# Patient Record
Sex: MALE | Race: WHITE | NOT HISPANIC OR LATINO
[De-identification: names, ages, dates, MRNs, and addresses within clinical notes are randomized per-mention and may not be internally consistent; named-entity substitution may affect disease eponyms.]

---

## 2018-01-12 ENCOUNTER — TRANSCRIPTION ENCOUNTER (OUTPATIENT)
Age: 58
End: 2018-01-12

## 2020-03-02 ENCOUNTER — OUTPATIENT (OUTPATIENT)
Dept: OUTPATIENT SERVICES | Facility: HOSPITAL | Age: 60
LOS: 1 days | End: 2020-03-02

## 2020-05-08 ENCOUNTER — TRANSCRIPTION ENCOUNTER (OUTPATIENT)
Age: 60
End: 2020-05-08

## 2020-09-28 ENCOUNTER — OUTPATIENT (OUTPATIENT)
Dept: OUTPATIENT SERVICES | Facility: HOSPITAL | Age: 60
LOS: 1 days | End: 2020-09-28

## 2020-10-02 ENCOUNTER — TRANSCRIPTION ENCOUNTER (OUTPATIENT)
Age: 60
End: 2020-10-02

## 2020-10-13 PROBLEM — Z00.00 ENCOUNTER FOR PREVENTIVE HEALTH EXAMINATION: Status: ACTIVE | Noted: 2020-10-13

## 2020-10-15 ENCOUNTER — NON-APPOINTMENT (OUTPATIENT)
Age: 60
End: 2020-10-15

## 2020-10-15 ENCOUNTER — APPOINTMENT (OUTPATIENT)
Dept: CARDIOLOGY | Facility: CLINIC | Age: 60
End: 2020-10-15
Payer: COMMERCIAL

## 2020-10-15 VITALS
WEIGHT: 239 LBS | HEIGHT: 71 IN | OXYGEN SATURATION: 99 % | DIASTOLIC BLOOD PRESSURE: 76 MMHG | TEMPERATURE: 96.9 F | SYSTOLIC BLOOD PRESSURE: 142 MMHG | HEART RATE: 91 BPM | RESPIRATION RATE: 18 BRPM

## 2020-10-15 DIAGNOSIS — Z63.5 DISRUPTION OF FAMILY BY SEPARATION AND DIVORCE: ICD-10-CM

## 2020-10-15 DIAGNOSIS — R94.39 ABNORMAL RESULT OF OTHER CARDIOVASCULAR FUNCTION STUDY: ICD-10-CM

## 2020-10-15 DIAGNOSIS — I48.0 PAROXYSMAL ATRIAL FIBRILLATION: ICD-10-CM

## 2020-10-15 DIAGNOSIS — Z72.3 LACK OF PHYSICAL EXERCISE: ICD-10-CM

## 2020-10-15 DIAGNOSIS — I10 ESSENTIAL (PRIMARY) HYPERTENSION: ICD-10-CM

## 2020-10-15 DIAGNOSIS — Z82.49 FAMILY HISTORY OF ISCHEMIC HEART DISEASE AND OTHER DISEASES OF THE CIRCULATORY SYSTEM: ICD-10-CM

## 2020-10-15 DIAGNOSIS — Z87.442 PERSONAL HISTORY OF URINARY CALCULI: ICD-10-CM

## 2020-10-15 PROCEDURE — 93000 ELECTROCARDIOGRAM COMPLETE: CPT

## 2020-10-15 PROCEDURE — 99204 OFFICE O/P NEW MOD 45 MIN: CPT

## 2020-10-15 RX ORDER — AMLODIPINE BESYLATE 10 MG/1
10 TABLET ORAL
Qty: 90 | Refills: 3 | Status: ACTIVE | COMMUNITY
Start: 2020-10-15

## 2020-10-15 RX ORDER — CLOPIDOGREL BISULFATE 75 MG/1
75 TABLET, FILM COATED ORAL DAILY
Qty: 30 | Refills: 1 | Status: ACTIVE | COMMUNITY
Start: 2020-10-15 | End: 1900-01-01

## 2020-10-15 RX ORDER — IRBESARTAN 150 MG/1
150 TABLET ORAL
Qty: 90 | Refills: 1 | Status: ACTIVE | COMMUNITY
Start: 2020-10-15

## 2020-10-15 RX ORDER — TAMSULOSIN HYDROCHLORIDE 0.4 MG/1
0.4 CAPSULE ORAL
Qty: 90 | Refills: 0 | Status: DISCONTINUED | COMMUNITY
Start: 2020-10-15 | End: 2020-10-15

## 2020-10-15 RX ORDER — METOPROLOL SUCCINATE 100 MG/1
100 TABLET, EXTENDED RELEASE ORAL
Qty: 90 | Refills: 3 | Status: ACTIVE | COMMUNITY
Start: 2020-10-15

## 2020-10-15 SDOH — SOCIAL STABILITY - SOCIAL INSECURITY: DISRUPTION OF FAMILY BY SEPARATION AND DIVORCE: Z63.5

## 2020-10-16 PROBLEM — R94.39 ABNORMAL STRESS TEST: Status: ACTIVE | Noted: 2020-10-15

## 2020-10-16 PROBLEM — I10 HTN (HYPERTENSION): Status: ACTIVE | Noted: 2020-10-15

## 2020-10-16 NOTE — DISCUSSION/SUMMARY
[FreeTextEntry1] : Patient with history of hypertension and limited exercise capacity. Presenting with intermediate risk nuclear stress test. \par Plan for cardiac cath next week. Had a lengthy discussion with family regarding risks/benefits of the procedure. \par He is allergic to aspirin which appears to be true allergy and non insensitivity, will plan for plavix 75 mg once a day. If PCI needed, will consider plavix bid vs brilinta.

## 2020-10-16 NOTE — HISTORY OF PRESENT ILLNESS
[FreeTextEntry1] : Patient with history of hypertension, was planned for hip surgery. Underwent nuclear stress testing with moderate inferior ischemia with ? inferior infarct. \par Patient presents for evaluation for cardiac cath.\par Not extremely physically active. \par No significant symptoms. \par

## 2020-10-16 NOTE — PHYSICAL EXAM
[General Appearance - Well Developed] : well developed [Normal Conjunctiva] : the conjunctiva exhibited no abnormalities [Normal Oral Mucosa] : normal oral mucosa [Normal Jugular Venous V Waves Present] : normal jugular venous V waves present [Heart Rate And Rhythm] : heart rate and rhythm were normal [Bowel Sounds] : normal bowel sounds [] : no respiratory distress [Abnormal Walk] : normal gait [Nail Clubbing] : no clubbing of the fingernails [Oriented To Time, Place, And Person] : oriented to person, place, and time [Skin Color & Pigmentation] : normal skin color and pigmentation

## 2020-10-17 DIAGNOSIS — Z01.818 ENCOUNTER FOR OTHER PREPROCEDURAL EXAMINATION: ICD-10-CM

## 2020-10-18 ENCOUNTER — APPOINTMENT (OUTPATIENT)
Dept: DISASTER EMERGENCY | Facility: CLINIC | Age: 60
End: 2020-10-18

## 2020-10-19 LAB — SARS-COV-2 N GENE NPH QL NAA+PROBE: NOT DETECTED

## 2020-10-21 ENCOUNTER — TRANSCRIPTION ENCOUNTER (OUTPATIENT)
Age: 60
End: 2020-10-21

## 2020-10-21 ENCOUNTER — INPATIENT (INPATIENT)
Facility: HOSPITAL | Age: 60
LOS: 0 days | Discharge: HOME | DRG: 287 | End: 2020-10-21
Attending: INTERNAL MEDICINE | Admitting: INTERNAL MEDICINE
Payer: COMMERCIAL

## 2020-10-21 VITALS
TEMPERATURE: 98 F | RESPIRATION RATE: 18 BRPM | HEIGHT: 71 IN | SYSTOLIC BLOOD PRESSURE: 151 MMHG | HEART RATE: 84 BPM | DIASTOLIC BLOOD PRESSURE: 81 MMHG | OXYGEN SATURATION: 96 % | WEIGHT: 240.08 LBS

## 2020-10-21 VITALS — RESPIRATION RATE: 17 BRPM | SYSTOLIC BLOOD PRESSURE: 123 MMHG | DIASTOLIC BLOOD PRESSURE: 74 MMHG | HEART RATE: 62 BPM

## 2020-10-21 DIAGNOSIS — Z98.890 OTHER SPECIFIED POSTPROCEDURAL STATES: Chronic | ICD-10-CM

## 2020-10-21 DIAGNOSIS — I20.0 UNSTABLE ANGINA: ICD-10-CM

## 2020-10-21 DIAGNOSIS — R07.9 CHEST PAIN, UNSPECIFIED: ICD-10-CM

## 2020-10-21 LAB
ALBUMIN SERPL ELPH-MCNC: 4.5 G/DL — SIGNIFICANT CHANGE UP (ref 3.3–5.2)
ALP SERPL-CCNC: 72 U/L — SIGNIFICANT CHANGE UP (ref 40–120)
ALT FLD-CCNC: 17 U/L — SIGNIFICANT CHANGE UP
ANION GAP SERPL CALC-SCNC: 14 MMOL/L — SIGNIFICANT CHANGE UP (ref 5–17)
APTT BLD: 30.1 SEC — SIGNIFICANT CHANGE UP (ref 27.5–35.5)
AST SERPL-CCNC: 15 U/L — SIGNIFICANT CHANGE UP
BASOPHILS # BLD AUTO: 0.04 K/UL — SIGNIFICANT CHANGE UP (ref 0–0.2)
BASOPHILS NFR BLD AUTO: 0.7 % — SIGNIFICANT CHANGE UP (ref 0–2)
BILIRUB SERPL-MCNC: 0.7 MG/DL — SIGNIFICANT CHANGE UP (ref 0.4–2)
BLD GP AB SCN SERPL QL: SIGNIFICANT CHANGE UP
BUN SERPL-MCNC: 19 MG/DL — SIGNIFICANT CHANGE UP (ref 8–20)
CALCIUM SERPL-MCNC: 9.5 MG/DL — SIGNIFICANT CHANGE UP (ref 8.6–10.2)
CHLORIDE SERPL-SCNC: 102 MMOL/L — SIGNIFICANT CHANGE UP (ref 98–107)
CO2 SERPL-SCNC: 24 MMOL/L — SIGNIFICANT CHANGE UP (ref 22–29)
CREAT SERPL-MCNC: 1.11 MG/DL — SIGNIFICANT CHANGE UP (ref 0.5–1.3)
EOSINOPHIL # BLD AUTO: 0.05 K/UL — SIGNIFICANT CHANGE UP (ref 0–0.5)
EOSINOPHIL NFR BLD AUTO: 0.8 % — SIGNIFICANT CHANGE UP (ref 0–6)
GLUCOSE SERPL-MCNC: 115 MG/DL — HIGH (ref 70–99)
HCT VFR BLD CALC: 47.4 % — SIGNIFICANT CHANGE UP (ref 39–50)
HGB BLD-MCNC: 16.3 G/DL — SIGNIFICANT CHANGE UP (ref 13–17)
IMM GRANULOCYTES NFR BLD AUTO: 0.5 % — SIGNIFICANT CHANGE UP (ref 0–1.5)
INR BLD: 1.05 RATIO — SIGNIFICANT CHANGE UP (ref 0.88–1.16)
LYMPHOCYTES # BLD AUTO: 1.43 K/UL — SIGNIFICANT CHANGE UP (ref 1–3.3)
LYMPHOCYTES # BLD AUTO: 23.7 % — SIGNIFICANT CHANGE UP (ref 13–44)
MAGNESIUM SERPL-MCNC: 2.1 MG/DL — SIGNIFICANT CHANGE UP (ref 1.6–2.6)
MCHC RBC-ENTMCNC: 29.7 PG — SIGNIFICANT CHANGE UP (ref 27–34)
MCHC RBC-ENTMCNC: 34.4 GM/DL — SIGNIFICANT CHANGE UP (ref 32–36)
MCV RBC AUTO: 86.3 FL — SIGNIFICANT CHANGE UP (ref 80–100)
MONOCYTES # BLD AUTO: 0.55 K/UL — SIGNIFICANT CHANGE UP (ref 0–0.9)
MONOCYTES NFR BLD AUTO: 9.1 % — SIGNIFICANT CHANGE UP (ref 2–14)
NEUTROPHILS # BLD AUTO: 3.93 K/UL — SIGNIFICANT CHANGE UP (ref 1.8–7.4)
NEUTROPHILS NFR BLD AUTO: 65.2 % — SIGNIFICANT CHANGE UP (ref 43–77)
NT-PROBNP SERPL-SCNC: 23 PG/ML — SIGNIFICANT CHANGE UP (ref 0–300)
PLATELET # BLD AUTO: 246 K/UL — SIGNIFICANT CHANGE UP (ref 150–400)
POTASSIUM SERPL-MCNC: 4.2 MMOL/L — SIGNIFICANT CHANGE UP (ref 3.5–5.3)
POTASSIUM SERPL-SCNC: 4.2 MMOL/L — SIGNIFICANT CHANGE UP (ref 3.5–5.3)
PROT SERPL-MCNC: 7.3 G/DL — SIGNIFICANT CHANGE UP (ref 6.6–8.7)
PROTHROM AB SERPL-ACNC: 12.2 SEC — SIGNIFICANT CHANGE UP (ref 10.6–13.6)
RBC # BLD: 5.49 M/UL — SIGNIFICANT CHANGE UP (ref 4.2–5.8)
RBC # FLD: 12 % — SIGNIFICANT CHANGE UP (ref 10.3–14.5)
SODIUM SERPL-SCNC: 140 MMOL/L — SIGNIFICANT CHANGE UP (ref 135–145)
TROPONIN T SERPL-MCNC: <0.01 NG/ML — SIGNIFICANT CHANGE UP (ref 0–0.06)
WBC # BLD: 6.03 K/UL — SIGNIFICANT CHANGE UP (ref 3.8–10.5)
WBC # FLD AUTO: 6.03 K/UL — SIGNIFICANT CHANGE UP (ref 3.8–10.5)

## 2020-10-21 PROCEDURE — C1887: CPT

## 2020-10-21 PROCEDURE — 36415 COLL VENOUS BLD VENIPUNCTURE: CPT

## 2020-10-21 PROCEDURE — C1894: CPT

## 2020-10-21 PROCEDURE — 84484 ASSAY OF TROPONIN QUANT: CPT

## 2020-10-21 PROCEDURE — 83880 ASSAY OF NATRIURETIC PEPTIDE: CPT

## 2020-10-21 PROCEDURE — 86850 RBC ANTIBODY SCREEN: CPT

## 2020-10-21 PROCEDURE — 99152 MOD SED SAME PHYS/QHP 5/>YRS: CPT

## 2020-10-21 PROCEDURE — 83735 ASSAY OF MAGNESIUM: CPT

## 2020-10-21 PROCEDURE — 99285 EMERGENCY DEPT VISIT HI MDM: CPT

## 2020-10-21 PROCEDURE — 93005 ELECTROCARDIOGRAM TRACING: CPT

## 2020-10-21 PROCEDURE — 93010 ELECTROCARDIOGRAM REPORT: CPT

## 2020-10-21 PROCEDURE — 99222 1ST HOSP IP/OBS MODERATE 55: CPT | Mod: 25

## 2020-10-21 PROCEDURE — 86901 BLOOD TYPING SEROLOGIC RH(D): CPT

## 2020-10-21 PROCEDURE — 80053 COMPREHEN METABOLIC PANEL: CPT

## 2020-10-21 PROCEDURE — 85610 PROTHROMBIN TIME: CPT

## 2020-10-21 PROCEDURE — 85730 THROMBOPLASTIN TIME PARTIAL: CPT

## 2020-10-21 PROCEDURE — 85025 COMPLETE CBC W/AUTO DIFF WBC: CPT

## 2020-10-21 PROCEDURE — 93458 L HRT ARTERY/VENTRICLE ANGIO: CPT

## 2020-10-21 PROCEDURE — 86900 BLOOD TYPING SEROLOGIC ABO: CPT

## 2020-10-21 PROCEDURE — C1769: CPT

## 2020-10-21 PROCEDURE — 93458 L HRT ARTERY/VENTRICLE ANGIO: CPT | Mod: 26

## 2020-10-21 RX ORDER — ATORVASTATIN CALCIUM 80 MG/1
1 TABLET, FILM COATED ORAL
Qty: 30 | Refills: 1
Start: 2020-10-21 | End: 2020-12-19

## 2020-10-21 RX ORDER — MIDAZOLAM HYDROCHLORIDE 1 MG/ML
1 INJECTION, SOLUTION INTRAMUSCULAR; INTRAVENOUS ONCE
Refills: 0 | Status: DISCONTINUED | OUTPATIENT
Start: 2020-10-21 | End: 2020-10-21

## 2020-10-21 RX ORDER — AMLODIPINE BESYLATE 2.5 MG/1
1 TABLET ORAL
Qty: 0 | Refills: 0 | DISCHARGE

## 2020-10-21 RX ORDER — IRBESARTAN 75 MG/1
1 TABLET ORAL
Qty: 0 | Refills: 0 | DISCHARGE

## 2020-10-21 RX ORDER — METOPROLOL TARTRATE 50 MG
1 TABLET ORAL
Qty: 0 | Refills: 0 | DISCHARGE

## 2020-10-21 RX ORDER — OMEGA-3 ACID ETHYL ESTERS 1 G
2 CAPSULE ORAL
Qty: 0 | Refills: 0 | DISCHARGE

## 2020-10-21 RX ORDER — ZOLPIDEM TARTRATE 10 MG/1
1 TABLET ORAL
Qty: 0 | Refills: 0 | DISCHARGE

## 2020-10-21 RX ORDER — CLOPIDOGREL BISULFATE 75 MG/1
1 TABLET, FILM COATED ORAL
Qty: 0 | Refills: 0 | DISCHARGE

## 2020-10-21 RX ADMIN — MIDAZOLAM HYDROCHLORIDE 1 MILLIGRAM(S): 1 INJECTION, SOLUTION INTRAMUSCULAR; INTRAVENOUS at 09:50

## 2020-10-21 NOTE — PROGRESS NOTE ADULT - SUBJECTIVE AND OBJECTIVE BOX
Department of Cardiology                                                                  [facility] [address] [address]                                                            Telephone: [phone]. Fax:[phone]                                                    Post- Procedure Note: Left Heart Cardiac Catheterization       Narrative:  59y  Male is now s/p left heart catheterization via right radial approach (radial band in place; no site complications) with Dr. Hardin:  Right radial artery precautions  No cardiac intervention  Heparin used: 4,000 units  IVF used: 50ml  Contrast used: 61ml       PAST MEDICAL & SURGICAL HISTORY:  Renal calculi    PAF (paroxysmal atrial fibrillation)  on toprol; not on AC; remote hx had holter monitoring in the past revealing no afib    Unstable angina    Obesity    HTN (hypertension)    H/O cystoscopy      FAMILY HISTORY:  FH: myocardial infarction  father      Home Medications:  amLODIPine 10 mg oral tablet: 1 tab(s) orally once a day (21 Oct 2020 08:51)  clopidogrel 75 mg oral tablet: 1 tab(s) orally once a day (21 Oct 2020 08:51)  Fish Oil oral capsule: 2 cap(s) orally once a day (21 Oct 2020 08:51)  irbesartan 150 mg oral tablet: 1 tab(s) orally once a day (21 Oct 2020 08:51)  Toprol- mg oral tablet, extended release: 1 tab(s) orally once a day (21 Oct 2020 08:51)  zolpidem 12.5 mg oral tablet, extended release: 1 tab(s) orally once a day (at bedtime) (21 Oct 2020 08:51)    Patient is a 59y old  Male who presents with a chief complaint of CCSC III/ abnormal stress test; elective left heart cardiac catheterization (21 Oct 2020 11:24)    HEALTH ISSUES - PROBLEM Dx:  Unstable angina    HPI:  Narrative: 58 y/o obese M, never smoker, with a PMHx of pAF (remote hx; not on AC; controlled with BB), HTN, renal calculi (s/p cystoscopy), family hx MI (father at age 60) presented to the ED with c/o chest pain.  Over the last few days, he has been endorsing midsternal chest pain resolving on it's own which now is progressive and with minimal exertion.  He recently had a stress test performed at [facility] revealing mid inferior myocardial ischemia. He is also undergoing cardiac clearance prior to his future right hip surgery, however, he was recommended for a cardiac catheterization based on his weight, hx of HTN, abnormal stress test, and now unstable anginal symptoms.   He presents today in anticipation of a left heart cardiac catheterization with Dr. Hardin to evaluate coronary vasculature.  He currently is free from chest pain, SOB, dyspnea, lower extremity edema.    NST 10/9/20 revealed small nontransmural basal to mid inferior wall myocardial infarction with mild degree of myocardial ischemia involving the mid inferior segment.  The left ventricular systolic function is normal; EF 73% (21 Oct 2020 11:24)    General: No fatigue, no fevers/chills  Respiratory: No dyspnea, no cough, no wheeze  CV: No chest pain, no palpitations  Abd: No nausea  Neuro: No headache, no dizziness  aspirin (Unknown)  ibuprofen (Unknown)      Objective:  Vital Signs Last 24 Hrs  T(C): 36.4 (21 Oct 2020 08:48), Max: 36.5 (21 Oct 2020 07:22)  T(F): 97.5 (21 Oct 2020 08:48), Max: 97.7 (21 Oct 2020 07:22)  HR: 69 (21 Oct 2020 11:30) (69 - 90)  BP: 127/71 (21 Oct 2020 11:30) (127/71 - 151/81)  BP(mean): --  RR: 17 (21 Oct 2020 11:30) (16 - 18)  SpO2: 98% (21 Oct 2020 11:30) (95% - 100%)    CM: SR  Neuro: A&OX3, CN 2-12 intact  HEENT: NC, AT  Lungs: CTA B/L  CV: S1, S2, no murmur, RRR  Abd: Soft  Right radial cath site: no bleeding, no hematoma  Extremity: + distal pulses                          16.3   6.03  )-----------( 246      ( 21 Oct 2020 07:52 )             47.4     10-21    140  |  102  |  19.0  ----------------------------<  115<H>  4.2   |  24.0  |  1.11    Ca    9.5      21 Oct 2020 07:52  Mg     2.1     10-21    TPro  7.3  /  Alb  4.5  /  TBili  0.7  /  DBili  x   /  AST  15  /  ALT  17  /  AlkPhos  72  10-21  PT/INR - ( 21 Oct 2020 07:52 )   PT: 12.2 sec;   INR: 1.05 ratio    PTT - ( 21 Oct 2020 07:52 )  PTT:30.1 sec      59y  Male is now s/p left heart catheterization via right radial approach with Dr. Hardin    -post cardiac cath orders  -right radial  precautions  -bedrest x 1 hour post procedure  -remove radial band 1 hour post procedure  -continue current medical therapy (home meds ARB, BB)  -Single anti platelet therapy with plavix  -Continue your follow up with Dr. Castle in anticipation of your right hip surgery  -diet and exercise modification  -Dc home 1 hour after radial band removal if radial site ok

## 2020-10-21 NOTE — DISCHARGE NOTE NURSING/CASE MANAGEMENT/SOCIAL WORK - PATIENT PORTAL LINK FT
You can access the FollowMyHealth Patient Portal offered by [facility] by registering at the following website: [email or web address]. By joining [employer]’s FollowMyHealth portal, you will also be able to view your health information using other applications (apps) compatible with our system.

## 2020-10-21 NOTE — H&P PST ADULT - NSICDXPASTMEDICALHX_GEN_ALL_CORE_FT
PAST MEDICAL HISTORY:  HTN (hypertension)     Obesity     PAF (paroxysmal atrial fibrillation) on toprol; not on AC; remote hx had holter monitoring in the past revealing no afib    Renal calculi     Unstable angina

## 2020-10-21 NOTE — H&P PST ADULT - NEUROLOGICAL DETAILS
cranial nerves intact/normal strength/deep reflexes intact/no spontaneous movement/alert and oriented x 3/sensation intact/responds to verbal commands/superficial reflexes intact/responds to pain

## 2020-10-21 NOTE — ED PROVIDER NOTE - CLINICAL SUMMARY MEDICAL DECISION MAKING FREE TEXT BOX
patient with concerning CP for ACS, planned for outpt cath but sx worsening acutely today. EKG no ischemic changes. will check labs w/ trop. SScards consult. ilkely admit for cath

## 2020-10-21 NOTE — ED ADULT NURSE NOTE - RESPIRATORY ASSESSMENT
- - - You can access the FollowMyHealth Patient Portal offered by [facility] by registering at the following website: [email or web address]. By joining [employer]’s FollowMyHealth portal, you will also be able to view your health information using other applications (apps) compatible with our system.

## 2020-10-21 NOTE — ED PROVIDER NOTE - OBJECTIVE STATEMENT
60yo M being worked up for cardiac CP outpatient, today woke up 4am with midsternal crushing/pressure CP, intermittent nonradiating. no associated sx. currently asymptomatic. no leg swelling. no SOB. did not take anything for the pain. pain not exertional.

## 2020-10-21 NOTE — H&P PST ADULT - ASSESSMENT
58 y/o obese M with PMHx of pAF (not on AC), HTN, abnormal stress test now with CCSC III; here for LHC with Dr. Hardin

## 2020-10-21 NOTE — DISCHARGE NOTE PROVIDER - NSDCMRMEDTOKEN_GEN_ALL_CORE_FT
amLODIPine 10 mg oral tablet: 1 tab(s) orally once a day  atorvastatin 20 mg oral tablet: 1 tab(s) orally once a day (at bedtime)   clopidogrel 75 mg oral tablet: 1 tab(s) orally once a day  Fish Oil oral capsule: 2 cap(s) orally once a day  irbesartan 150 mg oral tablet: 1 tab(s) orally once a day  Toprol- mg oral tablet, extended release: 1 tab(s) orally once a day  zolpidem 12.5 mg oral tablet, extended release: 1 tab(s) orally once a day (at bedtime)

## 2020-10-21 NOTE — H&P PST ADULT - NSICDXPROBLEM_GEN_ALL_CORE_FT
PROBLEM DIAGNOSES  Problem: Unstable angina  Assessment and Plan: -consent obtained at bedside with Dr. Hardin  -procedure d/w patient, risks and benefits explained, questions answered  -NST results reviewed in allscripts

## 2020-10-21 NOTE — ED ADULT NURSE NOTE - OBJECTIVE STATEMENT
pt awake, alert and oriented x3 c/o chest pain.  pt states he was due for cardiac cath with Dr. Hardin today, had worsening chest pain this morning and was told to come to ED.  respirations even and unlabored.  skin warm and dry.  NSR on cardiac monitor.  no edema noted.

## 2020-10-21 NOTE — DISCHARGE NOTE PROVIDER - HOSPITAL COURSE
58 y/o obese M, never smoker, with a PMHx of pAF (remote hx; not on AC; controlled with BB), HTN, renal calculi (s/p cystoscopy), family hx MI (father at age 60) presented to the ED with c/o chest pain.  Over the last few days, he has been endorsing midsternal chest pain resolving on it's own which now is progressive and with minimal exertion.  He recently had a [place] [facility] [place] mid inferior myocardial ischemia. He is also undergoing cardiac clearance prior to his future right hip surgery, however, he was recommended for a cardiac catheterization based on his weight, hx of HTN, abnormal stress test, and now unstable anginal symptoms.     now s/p left heart catheterization via right radial approach with Dr. Hardin:  Right radial artery precautions  No cardiac intervention

## 2020-10-21 NOTE — H&P PST ADULT - NEGATIVE NEUROLOGICAL SYMPTOMS
no focal seizures/no syncope/no tremors/no headache/no transient paralysis/no weakness/no vertigo/no generalized seizures/no paresthesias/no loss of sensation/no difficulty walking/no loss of consciousness

## 2020-10-21 NOTE — H&P PST ADULT - HISTORY OF PRESENT ILLNESS
Narrative: 58 y/o obese M, never smoker, with a PMHx of pAF (remote hx; not on AC; controlled with BB), HTN, renal calculi (s/p cystoscopy), family hx MI (father at age 60) presented to the ED with c/o chest pain.  Over the last few days, he has been endorsing midsternal chest pain resolving on it's own which now is progressive and with minimal exertion.  He recently had a stress test performed at [facility] revealing mid inferior myocardial ischemia. He is also undergoing cardiac clearance prior to his future right hip surgery, however, he was recommended for a cardiac catheterization based on his weight, hx of HTN, abnormal stress test, and now unstable anginal symptoms.   He presents today in anticipation of a left heart cardiac catheterization with Dr. Hardin to evaluate coronary vasculature.  He currently is free from chest pain, SOB, dyspnea, lower extremity edema.    NST 10/9/20 revealed small nontransmural basal to mid inferior wall myocardial infarction with mild degree of myocardial ischemia involving the mid inferior segment.  The left ventricular systolic function is normal; EF 73%

## 2020-10-21 NOTE — H&P PST ADULT - RS GEN PE MLT RESP DETAILS PC
no rales/clear to auscultation bilaterally/no chest wall tenderness/no intercostal retractions/breath sounds equal/respirations non-labored/airway patent/good air movement/normal/no rhonchi

## 2020-10-21 NOTE — DISCHARGE NOTE PROVIDER - CARE PROVIDER_API CALL
BRANDON AVILA  Internal Medicine  [address]  [place] [address]  Phone: [phone]  Fax: [phone]  Follow Up Time:     Brian Hardin)  Cardiovascular Disease; Interventional Cardiology  [address], [address]  [place] [address]  Phone: [phone]  Fax: [phone]  Follow Up Time:

## 2020-10-21 NOTE — DISCHARGE NOTE PROVIDER - NSDCQMAMI_CARD_ALL_CORE
Discussion/Summary   YOUR LABS ARE ALL OK AND CHEST XRAY WAS OK- AWAIT TEST ON ESOPHAGUS         Verified Results  CBC WITH AUTOMATED DIFFERENTIAL [id number] 11:38AM LUKE ADLER   [Jul 31, 2018 8:47AM LUKE ADLER]  ciara nicolevelma     Test Name Result Flag Reference   WHITE BLOOD COUNT 5.0 K/mcL  4.2-11.0   RED CELL COUNT 4.91 mil/mcL  4.50-5.90   HEMOGLOBIN 15.7 g/dl  13.0-17.0   HEMATOCRIT 45.7 %  39.0-51.0   MEAN CORPUSCULAR VOLUME 93.1 fL  78.0-100.0   MEAN CORPUSCULAR HEMOGLOBIN 32.0 pg  26.0-34.0   MEAN CORPUSCULAR HGB CONC 34.4 g/dl  32.0-36.5   RDW-CV 12.5 %  11.0-15.0   PLATELET COUNT 233 K/mcL  140-450   HANK% 58 %     LYM% 30 %     MON% 9 %     EOS% 2 %     BASO% 1 %     HANK ABS 2.9 K/mcL  1.8-7.7   LYM ABS 1.5 K/mcL  1.0-4.0   MON ABS 0.5 K/mcL  0.3-0.9   EOS ABS 0.1 K/mcL  0.1-0.5   BASO ABS 0.1 K/mcL  0.0-0.3   DIFF TYPE      AUTOMATED DIFFERENTIAL   NRBC 0 /100 WBC  0   PERCENT IMMATURE GRANULOCYTES 0 %     ABSOLUTE IMMATURE GRANULOCYTES 0.0 K/mcL  0-0.2     COMP METABOLIC PANEL WITH LIPID PANEL (CPNL,LIPDPL) [id number] 11:38AM LUKE ADLER   [Jul 31, 2018 8:47AM LUKE ADLER]  ciara galeano     Test Name Result Flag Reference   SODIUM 137 mmol/L  135-145   POTASSIUM 4.1 mmol/L  3.4-5.1   CHLORIDE 99 mmol/L     CARBON DIOXIDE 29 mmol/L  21-32   ANION GAP 13 mmol/L  10-20   GLUCOSE 84 mg/dl  65-99   BUN 11 mg/dl  6-20   CREATININE 1.01 mg/dl  0.67-1.17   GFR EST.AFRICAN AMER >90     eGFR results = or >90 mL/min/1.73m2 = Normal kidney function.   GFR EST.NONAFRI AMER 80     eGFR 60 - 89 mL/min/1.73m2 = Mild decrease in kidney function.   BUN/CREATININE RATIO 11  7-25   BILIRUBIN TOTAL 1.6 mg/dl H 0.2-1.0   GOT/AST 28 Units/L  <38   ALKALINE PHOSPHATASE 45 Units/L     ALBUMIN 4.3 g/dl  3.6-5.1   TOTAL PROTEIN 6.9 g/dl  6.4-8.2   GLOBULIN (CALCULATED) 2.6 g/dl  2.0-4.0   A/G RATIO 1.7  1.0-2.4   CALCIUM 9.3 mg/dl  8.4-10.2   GPT/ALT 32 Units/L  <79   FASTING STATUS 2 hrs      CHOLESTEROL 195 mg/dl  <200   Desirable            <200  Borderline High      200 to 239  High                 >=240   HDL CHOLESTEROL 82 mg/dl  >39   Low            <40  Borderline Low 40 to 49  Near Optimal   50 to 59  Optimal        >=60   TRIGLYCERIDES 92 mg/dl  <150   Normal                   <150  Borderline High          150 to 199  High                     200 to 499  Very High                >=500   LDL CHOLESTEROL (CALCULATED) 95 mg/dl  <130   OPTIMAL               <100  NEAR OPTIMAL          100-129  BORDERLINE HIGH       130-159  HIGH                  160-189  VERY HIGH             >=190   NON-HDL CHOLESTEROL 113 mg/dl     Therapeutic Target:  CHD and risk equivalents <130  Multiple risk factors    <160  0 to 1 risk factors      <190   CHOLESTEROL/HDL RATIO 2.4  <4.5   FASTING STATUS 2 hrs          No

## 2020-10-21 NOTE — DISCHARGE NOTE PROVIDER - NSDCFUSCHEDAPPT_GEN_ALL_CORE_FT
ROSEMARIE PEARSON ; 10/21/2020 ; [facility] Preadmit  ROSEMARIE PEARSON ; 11/19/2020 ; [facility] [address]

## 2020-10-21 NOTE — DISCHARGE NOTE PROVIDER - NSDCCPTREATMENT_GEN_ALL_CORE_FT
PRINCIPAL PROCEDURE  Procedure: Left heart cardiac catheterization  Findings and Treatment: Restricted use with no heavy lifting of affected arm for 48 hours.  No submerging the arm in water for 48 hours.  You may start showering today.  Call your doctor for any bleeding, swelling, loss of sensation in the hand or fingers, or fingers turning blue.  If heavy bleeding or large lumps form, hold pressure at the spot and come to the Emergency Room.

## 2020-10-21 NOTE — DISCHARGE NOTE PROVIDER - NSDCCPCAREPLAN_GEN_ALL_CORE_FT
PRINCIPAL DISCHARGE DIAGNOSIS  Diagnosis: Mild CAD  Assessment and Plan of Treatment: -you had a left heart cardiac catheterization through your right radial artery  -you had mild coronary artery disease; no cardiac stents were placed during your hospitalization  -continue your current medications  -please  your atorvastatin presciption which was sent to your home pharmacy ([facility]). This medication is to control your cholesterol and reduce plaque formation on the inside of your vessels  -continue your follow up with your PCP and orthopedic to forego your right hip surgery  -diet and exercise modification   -you may return to work on Monday 10/26/20

## 2020-10-21 NOTE — ED ADULT TRIAGE NOTE - CHIEF COMPLAINT QUOTE
pt A&Ox 4 from home c/o chest pain, palpitations and intermittent SOB that began overnight. Pt scheduled to have cardiac cath today with Dr Hardin- advised to come to ED stat

## 2020-11-02 PROBLEM — I10 ESSENTIAL (PRIMARY) HYPERTENSION: Chronic | Status: ACTIVE | Noted: 2020-10-21

## 2020-11-02 PROBLEM — E66.9 OBESITY, UNSPECIFIED: Chronic | Status: ACTIVE | Noted: 2020-10-21

## 2020-11-02 PROBLEM — N20.0 CALCULUS OF KIDNEY: Chronic | Status: ACTIVE | Noted: 2020-10-21

## 2020-11-02 PROBLEM — I48.0 PAROXYSMAL ATRIAL FIBRILLATION: Chronic | Status: ACTIVE | Noted: 2020-10-21

## 2020-11-02 PROBLEM — I20.0 UNSTABLE ANGINA: Chronic | Status: ACTIVE | Noted: 2020-10-21

## 2020-11-04 ENCOUNTER — OUTPATIENT (OUTPATIENT)
Dept: OUTPATIENT SERVICES | Facility: HOSPITAL | Age: 60
LOS: 1 days | End: 2020-11-04
Payer: COMMERCIAL

## 2020-11-04 DIAGNOSIS — Z98.890 OTHER SPECIFIED POSTPROCEDURAL STATES: Chronic | ICD-10-CM

## 2020-11-04 PROCEDURE — 93010 ELECTROCARDIOGRAM REPORT: CPT

## 2020-11-06 ENCOUNTER — APPOINTMENT (OUTPATIENT)
Dept: DISASTER EMERGENCY | Facility: CLINIC | Age: 60
End: 2020-11-06

## 2020-11-07 LAB — SARS-COV-2 N GENE NPH QL NAA+PROBE: NOT DETECTED

## 2020-11-09 ENCOUNTER — OUTPATIENT (OUTPATIENT)
Dept: OUTPATIENT SERVICES | Facility: HOSPITAL | Age: 60
LOS: 1 days | End: 2020-11-09

## 2020-11-09 ENCOUNTER — INPATIENT (INPATIENT)
Facility: HOSPITAL | Age: 60
LOS: 1 days | Discharge: HOME HEALTH | End: 2020-11-11
Payer: COMMERCIAL

## 2020-11-09 DIAGNOSIS — Z98.890 OTHER SPECIFIED POSTPROCEDURAL STATES: Chronic | ICD-10-CM

## 2020-11-09 PROCEDURE — 73501 X-RAY EXAM HIP UNI 1 VIEW: CPT | Mod: 26,RT

## 2020-11-10 ENCOUNTER — OUTPATIENT (OUTPATIENT)
Dept: OUTPATIENT SERVICES | Facility: HOSPITAL | Age: 60
LOS: 1 days | End: 2020-11-10

## 2020-11-10 DIAGNOSIS — Z98.890 OTHER SPECIFIED POSTPROCEDURAL STATES: Chronic | ICD-10-CM

## 2020-11-11 ENCOUNTER — OUTPATIENT (OUTPATIENT)
Dept: OUTPATIENT SERVICES | Facility: HOSPITAL | Age: 60
LOS: 1 days | End: 2020-11-11

## 2020-11-11 DIAGNOSIS — Z98.890 OTHER SPECIFIED POSTPROCEDURAL STATES: Chronic | ICD-10-CM

## 2020-11-19 ENCOUNTER — APPOINTMENT (OUTPATIENT)
Dept: CARDIOLOGY | Facility: CLINIC | Age: 60
End: 2020-11-19

## 2020-12-29 ENCOUNTER — TRANSCRIPTION ENCOUNTER (OUTPATIENT)
Age: 60
End: 2020-12-29

## 2021-02-09 ENCOUNTER — OUTPATIENT (OUTPATIENT)
Dept: OUTPATIENT SERVICES | Facility: HOSPITAL | Age: 61
LOS: 1 days | End: 2021-02-09
Payer: COMMERCIAL

## 2021-02-09 DIAGNOSIS — Z98.890 OTHER SPECIFIED POSTPROCEDURAL STATES: Chronic | ICD-10-CM

## 2021-02-09 DIAGNOSIS — Z20.828 CONTACT WITH AND (SUSPECTED) EXPOSURE TO OTHER VIRAL COMMUNICABLE DISEASES: ICD-10-CM

## 2021-02-09 LAB — SARS-COV-2 RNA SPEC QL NAA+PROBE: SIGNIFICANT CHANGE UP

## 2021-02-09 PROCEDURE — U0003: CPT

## 2021-02-09 PROCEDURE — U0005: CPT

## 2021-02-19 NOTE — H&P PST ADULT - EXTREMITIES
ED Time Seen By Provider Entered On:  6/30/2017 9:44     Performed On:  6/30/2017 9:44  by Della Mckeon      Time Seen By Provider   Time Seen by Provider :   6/30/2017 09:44    Della Mckeon - 6/30/2017 9:44            No cyanosis, clubbing or edema

## 2021-11-01 ENCOUNTER — OUTPATIENT (OUTPATIENT)
Dept: OUTPATIENT SERVICES | Facility: HOSPITAL | Age: 61
LOS: 1 days | End: 2021-11-01

## 2021-11-01 ENCOUNTER — APPOINTMENT (OUTPATIENT)
Dept: MRI IMAGING | Facility: CLINIC | Age: 61
End: 2021-11-01
Payer: COMMERCIAL

## 2021-11-01 DIAGNOSIS — Z98.890 OTHER SPECIFIED POSTPROCEDURAL STATES: Chronic | ICD-10-CM

## 2021-11-01 DIAGNOSIS — Z00.00 ENCOUNTER FOR GENERAL ADULT MEDICAL EXAMINATION WITHOUT ABNORMAL FINDINGS: ICD-10-CM

## 2021-11-01 PROCEDURE — 73721 MRI JNT OF LWR EXTRE W/O DYE: CPT | Mod: 26,LT

## 2021-11-23 ENCOUNTER — TRANSCRIPTION ENCOUNTER (OUTPATIENT)
Age: 61
End: 2021-11-23

## 2024-12-19 NOTE — H&P PST ADULT - NSCAFFEINETYPE_GEN_ALL_CORE_SD
Ok to change to preop. 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 Thanks coffee

## 2025-04-01 ENCOUNTER — APPOINTMENT (OUTPATIENT)
Dept: COLORECTAL SURGERY | Facility: CLINIC | Age: 65
End: 2025-04-01
Payer: COMMERCIAL

## 2025-04-01 DIAGNOSIS — D12.3 BENIGN NEOPLASM OF TRANSVERSE COLON: ICD-10-CM

## 2025-04-01 DIAGNOSIS — C18.7 MALIGNANT NEOPLASM OF SIGMOID COLON: ICD-10-CM

## 2025-04-01 DIAGNOSIS — E66.01 MORBID (SEVERE) OBESITY DUE TO EXCESS CALORIES: ICD-10-CM

## 2025-04-01 PROCEDURE — 99205 OFFICE O/P NEW HI 60 MIN: CPT

## 2025-04-03 ENCOUNTER — NON-APPOINTMENT (OUTPATIENT)
Age: 65
End: 2025-04-03

## 2025-04-03 ENCOUNTER — OUTPATIENT (OUTPATIENT)
Dept: OUTPATIENT SERVICES | Facility: HOSPITAL | Age: 65
LOS: 1 days | End: 2025-04-03
Payer: COMMERCIAL

## 2025-04-03 ENCOUNTER — APPOINTMENT (OUTPATIENT)
Dept: CT IMAGING | Facility: CLINIC | Age: 65
End: 2025-04-03

## 2025-04-03 VITALS
HEART RATE: 70 BPM | WEIGHT: 227.74 LBS | SYSTOLIC BLOOD PRESSURE: 137 MMHG | HEIGHT: 71 IN | TEMPERATURE: 98 F | OXYGEN SATURATION: 100 % | DIASTOLIC BLOOD PRESSURE: 70 MMHG | RESPIRATION RATE: 16 BRPM

## 2025-04-03 DIAGNOSIS — Z96.642 PRESENCE OF LEFT ARTIFICIAL HIP JOINT: Chronic | ICD-10-CM

## 2025-04-03 DIAGNOSIS — Z96.641 PRESENCE OF RIGHT ARTIFICIAL HIP JOINT: Chronic | ICD-10-CM

## 2025-04-03 DIAGNOSIS — Z98.52 VASECTOMY STATUS: Chronic | ICD-10-CM

## 2025-04-03 DIAGNOSIS — Z01.818 ENCOUNTER FOR OTHER PREPROCEDURAL EXAMINATION: ICD-10-CM

## 2025-04-03 DIAGNOSIS — Z98.890 OTHER SPECIFIED POSTPROCEDURAL STATES: Chronic | ICD-10-CM

## 2025-04-03 LAB
ALBUMIN SERPL ELPH-MCNC: 4.6 G/DL
ALP BLD-CCNC: 103 U/L
ALT SERPL-CCNC: 22 U/L
ANION GAP SERPL CALC-SCNC: 13 MMOL/L
APTT BLD: 33.3 SEC — SIGNIFICANT CHANGE UP (ref 24.5–35.6)
AST SERPL-CCNC: 19 U/L
BILIRUB SERPL-MCNC: 0.7 MG/DL
BUN SERPL-MCNC: 18 MG/DL
CALCIUM SERPL-MCNC: 9.9 MG/DL
CEA SERPL-MCNC: 13.5 NG/ML
CHLORIDE SERPL-SCNC: 103 MMOL/L
CO2 SERPL-SCNC: 26 MMOL/L
CREAT SERPL-MCNC: 1.08 MG/DL
EGFRCR SERPLBLD CKD-EPI 2021: 78 ML/MIN/1.73M2
GLUCOSE SERPL-MCNC: 93 MG/DL
HCT VFR BLD CALC: 50.3 %
HGB BLD-MCNC: 16.1 G/DL
INR BLD: 0.97 RATIO — SIGNIFICANT CHANGE UP (ref 0.85–1.16)
MCHC RBC-ENTMCNC: 28 PG
MCHC RBC-ENTMCNC: 32 G/DL
MCV RBC AUTO: 87.3 FL
PLATELET # BLD AUTO: 292 K/UL
POTASSIUM SERPL-SCNC: 4.6 MMOL/L
PROT SERPL-MCNC: 7.5 G/DL
PROTHROM AB SERPL-ACNC: 11.5 SEC — SIGNIFICANT CHANGE UP (ref 9.9–13.4)
RBC # BLD: 5.76 M/UL
RBC # FLD: 13.1 %
SODIUM SERPL-SCNC: 143 MMOL/L
WBC # FLD AUTO: 7.27 K/UL

## 2025-04-03 PROCEDURE — 99214 OFFICE O/P EST MOD 30 MIN: CPT | Mod: 25

## 2025-04-03 PROCEDURE — 85610 PROTHROMBIN TIME: CPT

## 2025-04-03 PROCEDURE — 36415 COLL VENOUS BLD VENIPUNCTURE: CPT

## 2025-04-03 PROCEDURE — 85730 THROMBOPLASTIN TIME PARTIAL: CPT

## 2025-04-03 NOTE — H&P PST ADULT - NSICDXPASTSURGICALHX_GEN_ALL_CORE_FT
PAST SURGICAL HISTORY:  H/O vasectomy     History of left hip replacement     History of right hip replacement

## 2025-04-03 NOTE — H&P PST ADULT - ASSESSMENT
1.  Dr Kaplan  office  will instruct patient regarding bowel prep and  medication regimen on day of procedure.  2. Endoscopy booking will call patient the day before surgery for arrival instructions   3. NPO post midnight  4. CBC BMP coags  EKG done   5. Patient instructed to have responsible adult accompany/ drive pt home after procedure   64 year old male PMH significant for PAF ( no AC), HTN, insomnia; Patient had one episode of hematochezia; colonoscopy and biopsy done; diagnosed with sigmoid colon cancer and colon polyp; he presents  to PST for planned colonoscopy EMR    1.  Dr Kaplan  office  will instruct patient regarding bowel prep and  medication regimen on day of procedure.  2. Endoscopy booking will call patient the day before surgery for arrival instructions   3. NPO post midnight  4. CBC BMP coags  EKG done   5. Patient instructed to have responsible adult accompany/ drive pt home after procedure

## 2025-04-03 NOTE — H&P PST ADULT - HISTORY OF PRESENT ILLNESS
64 year old male PMH significant for PAF ( no AC), HTN, insomnia; Patient had one episode of hematochezia; colonoscopy and biopsy done; diagnosed with sigmoid colon cancer and colon polyp; he presents  to PST for planned colonoscopy EMR

## 2025-04-03 NOTE — H&P PST ADULT - BSA (M2)
After Visit Summary   10/30/2018    Ashly Paulino    MRN: [id number]           Patient Information     Date Of Birth          1956        Visit Information        Provider Department      10/30/2018 3:00 PM Juan Chaidez PT [facility]        Today's Diagnoses     Status post total left knee replacement        Acute pain of left knee           Follow-ups after your visit        Who to contact     If you have questions or need follow up information about today's clinic visit or your schedule please contact [facility] [place] [facility] directly at [phone].  Normal or non-critical lab and imaging results will be communicated to you by [facility], letter or phone within 4 business days after the clinic has received the results. If you do not hear from us within 7 days, please contact the clinic through [employer] or phone. If you have a critical or abnormal lab result, we will notify you by phone as soon as possible.  Submit refill requests through [employer] or call your pharmacy and they will forward the refill request to us. Please allow 3 business days for your refill to be completed.          Additional Information About Your Visit        MyChart Information     [employer] gives you secure access to your electronic health record. If you see a primary care provider, you can also send messages to your care team and make appointments. If you have questions, please call your primary care clinic.  If you do not have a primary care provider, please call [phone] and they will assist you.        Care EveryWhere ID     This is your Care EveryWhere ID. This could be used by other organizations to access your [facility] medical records  [id number]         Blood Pressure from Last 3 Encounters:   11/02/16 117/76   10/25/16 (!) 160/102    Weight from Last 3 Encounters:   11/13/17 97.6 kg (215 lb 1.6 oz)   10/31/16 97.5  kg (215 lb)   10/25/16 98.7 kg (217 lb 9.5 oz)              We Performed the Following     THERAPEUTIC ACTIVITIES     THERAPEUTIC EXERCISES        Primary Care Provider Office Phone # Fax #    Ashly Montano [phone]       [facility] [address] [place] [address] [facility] [address]  [place] [address]        Equal Access to Services     [facility]: Hadii aad ku hadasho Soomaali, waaxda luqadaha, qaybta kaalmada adeegyada, waxay idiin hayaan adeeg kharash la'aan . So [facility] [phone].    ATENCIÓN: Si habla español, tiene a clemons disposición servicios gratuitos de asistencia lingüística. Rizwanatrish al [phone].    We comply with applicable federal civil rights laws and [place] laws. We do not discriminate on the basis of race, color, national origin, age, disability, sex, sexual orientation, or gender identity.            Thank you!     Thank you for [facility]  for your care. Our goal is always to provide you with excellent care. Hearing back from our patients is one way we can continue to improve our services. Please take a few minutes to complete the written survey that you may receive in the mail after your visit with us. Thank you!             Your Updated Medication List - Protect others around you: Learn how to safely use, store and throw away your medicines at www.disposemymeds.org.          This list is accurate as of 10/30/18  3:44 PM.  Always use your most recent med list.                   Brand Name Dispense Instructions for use Diagnosis    ADVIL PO      Take 400 mg by mouth daily as needed        CELEBREX PO      Take 200 mg by mouth daily as needed        TRAMADOL HCL PO      Take 50 mg by mouth daily as needed for moderate to severe pain        TYLENOL PO      Take 650-1,300 mg by mouth daily as needed           2.23

## 2025-04-03 NOTE — H&P PST ADULT - NSICDXPASTMEDICALHX_GEN_ALL_CORE_FT
PAST MEDICAL HISTORY:  Adenomatous colon polyp     Cancer of sigmoid colon     Diverticulosis     HTN (hypertension)     Obesity     PAF (paroxysmal atrial fibrillation) on toprol; not on AC; remote hx had holter monitoring in the past revealing no afib    Renal calculi     Unstable angina      PAST MEDICAL HISTORY:  Adenomatous colon polyp     Cancer of sigmoid colon     Diverticulosis     HTN (hypertension)     Insomnia     Obesity     PAF (paroxysmal atrial fibrillation) on toprol; not on AC; remote hx had holter monitoring in the past revealing no afib    Renal calculi     Unstable angina

## 2025-04-03 NOTE — H&P PST ADULT - NSANTHOSAYNRD_GEN_A_CORE
No. REG screening performed.  STOP BANG Legend: 0-2 = LOW Risk; 3-4 = INTERMEDIATE Risk; 5-8 = HIGH Risk

## 2025-04-04 DIAGNOSIS — Z01.818 ENCOUNTER FOR OTHER PREPROCEDURAL EXAMINATION: ICD-10-CM

## 2025-04-08 ENCOUNTER — TRANSCRIPTION ENCOUNTER (OUTPATIENT)
Age: 65
End: 2025-04-08

## 2025-04-08 ENCOUNTER — OUTPATIENT (OUTPATIENT)
Dept: INPATIENT UNIT | Facility: HOSPITAL | Age: 65
LOS: 1 days | Discharge: HOME | End: 2025-04-08
Payer: COMMERCIAL

## 2025-04-08 ENCOUNTER — RESULT REVIEW (OUTPATIENT)
Age: 65
End: 2025-04-08

## 2025-04-08 ENCOUNTER — APPOINTMENT (OUTPATIENT)
Dept: GASTROENTEROLOGY | Facility: HOSPITAL | Age: 65
End: 2025-04-08

## 2025-04-08 VITALS
SYSTOLIC BLOOD PRESSURE: 143 MMHG | TEMPERATURE: 98 F | HEIGHT: 71 IN | RESPIRATION RATE: 16 BRPM | DIASTOLIC BLOOD PRESSURE: 85 MMHG | WEIGHT: 227.74 LBS | OXYGEN SATURATION: 99 % | HEART RATE: 91 BPM

## 2025-04-08 VITALS
SYSTOLIC BLOOD PRESSURE: 146 MMHG | OXYGEN SATURATION: 99 % | DIASTOLIC BLOOD PRESSURE: 79 MMHG | RESPIRATION RATE: 16 BRPM | HEART RATE: 67 BPM | TEMPERATURE: 98 F

## 2025-04-08 DIAGNOSIS — Z96.641 PRESENCE OF RIGHT ARTIFICIAL HIP JOINT: Chronic | ICD-10-CM

## 2025-04-08 DIAGNOSIS — Z98.52 VASECTOMY STATUS: Chronic | ICD-10-CM

## 2025-04-08 DIAGNOSIS — Z96.642 PRESENCE OF LEFT ARTIFICIAL HIP JOINT: Chronic | ICD-10-CM

## 2025-04-08 DIAGNOSIS — K63.5 POLYP OF COLON: ICD-10-CM

## 2025-04-08 PROCEDURE — 88305 TISSUE EXAM BY PATHOLOGIST: CPT

## 2025-04-08 PROCEDURE — 45390 COLONOSCOPY W/RESECTION: CPT

## 2025-04-08 PROCEDURE — 45381 COLONOSCOPY SUBMUCOUS NJX: CPT | Mod: 59

## 2025-04-08 PROCEDURE — C1889: CPT

## 2025-04-08 RX ORDER — FENTANYL CITRATE-0.9 % NACL/PF 100MCG/2ML
25 SYRINGE (ML) INTRAVENOUS
Refills: 0 | Status: DISCONTINUED | OUTPATIENT
Start: 2025-04-08 | End: 2025-04-08

## 2025-04-08 RX ORDER — SODIUM CHLORIDE 9 G/1000ML
1000 INJECTION, SOLUTION INTRAVENOUS
Refills: 0 | Status: DISCONTINUED | OUTPATIENT
Start: 2025-04-08 | End: 2025-04-08

## 2025-04-08 NOTE — ASU PATIENT PROFILE, ADULT - FALL HARM RISK - UNIVERSAL INTERVENTIONS
Bed in lowest position, wheels locked, appropriate side rails in place/Call bell, personal items and telephone in reach/Instruct patient to call for assistance before getting out of bed or chair/Non-slip footwear when patient is out of bed/[place] to call system/Physically safe environment - no spills, clutter or unnecessary equipment/Purposeful Proactive Rounding/Room/bathroom lighting operational, light cord in reach

## 2025-04-08 NOTE — ASU DISCHARGE PLAN (ADULT/PEDIATRIC) - FINANCIAL ASSISTANCE
[facility] provides services at a reduced cost to those who are determined to be eligible through [facility]’s financial assistance program. Information regarding [facility]’s financial assistance program can be found by going to [email or web address] or by calling [phone].

## 2025-04-08 NOTE — ASU PATIENT PROFILE, ADULT - NSICDXPASTMEDICALHX_GEN_ALL_CORE_FT
PAST MEDICAL HISTORY:  Adenomatous colon polyp     Cancer of sigmoid colon     Diverticulosis     HTN (hypertension)     Insomnia     Obesity     PAF (paroxysmal atrial fibrillation) on toprol; not on AC; remote hx had holter monitoring in the past revealing no afib    Renal calculi     Unstable angina

## 2025-04-10 ENCOUNTER — NON-APPOINTMENT (OUTPATIENT)
Age: 65
End: 2025-04-10

## 2025-04-10 ENCOUNTER — OUTPATIENT (OUTPATIENT)
Dept: OUTPATIENT SERVICES | Facility: HOSPITAL | Age: 65
LOS: 1 days | End: 2025-04-10
Payer: COMMERCIAL

## 2025-04-10 ENCOUNTER — RESULT REVIEW (OUTPATIENT)
Age: 65
End: 2025-04-10

## 2025-04-10 DIAGNOSIS — C18.7 MALIGNANT NEOPLASM OF SIGMOID COLON: ICD-10-CM

## 2025-04-10 DIAGNOSIS — Z96.642 PRESENCE OF LEFT ARTIFICIAL HIP JOINT: Chronic | ICD-10-CM

## 2025-04-10 DIAGNOSIS — Z98.52 VASECTOMY STATUS: Chronic | ICD-10-CM

## 2025-04-10 DIAGNOSIS — Z96.641 PRESENCE OF RIGHT ARTIFICIAL HIP JOINT: Chronic | ICD-10-CM

## 2025-04-10 PROBLEM — G47.00 INSOMNIA, UNSPECIFIED: Chronic | Status: ACTIVE | Noted: 2025-04-03

## 2025-04-10 PROBLEM — D12.6 BENIGN NEOPLASM OF COLON, UNSPECIFIED: Chronic | Status: ACTIVE | Noted: 2025-04-03

## 2025-04-10 PROBLEM — K57.90 DIVERTICULOSIS OF INTESTINE, PART UNSPECIFIED, WITHOUT PERFORATION OR ABSCESS WITHOUT BLEEDING: Chronic | Status: ACTIVE | Noted: 2025-04-03

## 2025-04-10 LAB
SURGICAL PATHOLOGY STUDY: SIGNIFICANT CHANGE UP
SURGICAL PATHOLOGY STUDY: SIGNIFICANT CHANGE UP

## 2025-04-10 PROCEDURE — 88321 CONSLTJ&REPRT SLD PREP ELSWR: CPT | Mod: 59

## 2025-04-10 PROCEDURE — 88305 TISSUE EXAM BY PATHOLOGIST: CPT | Mod: 26,59

## 2025-04-10 PROCEDURE — 88321 CONSLTJ&REPRT SLD PREP ELSWR: CPT

## 2025-04-11 DIAGNOSIS — C18.7 MALIGNANT NEOPLASM OF SIGMOID COLON: ICD-10-CM

## 2025-04-11 RX ORDER — METRONIDAZOLE 500 MG/1
500 TABLET ORAL
Qty: 6 | Refills: 0 | Status: ACTIVE | COMMUNITY
Start: 2025-04-11 | End: 1900-01-01

## 2025-04-11 RX ORDER — SODIUM SULFATE, POTASSIUM SULFATE AND MAGNESIUM SULFATE 1.6; 3.13; 17.5 G/177ML; G/177ML; G/177ML
17.5-3.13-1.6 SOLUTION ORAL
Qty: 2 | Refills: 0 | Status: ACTIVE | COMMUNITY
Start: 2025-04-02 | End: 1900-01-01

## 2025-04-11 RX ORDER — NEOMYCIN SULFATE 500 MG/1
500 TABLET ORAL
Qty: 6 | Refills: 0 | Status: ACTIVE | COMMUNITY
Start: 2025-04-11 | End: 1900-01-01

## 2025-04-14 ENCOUNTER — NON-APPOINTMENT (OUTPATIENT)
Age: 65
End: 2025-04-14

## 2025-04-14 DIAGNOSIS — Z85.038 PERSONAL HISTORY OF OTHER MALIGNANT NEOPLASM OF LARGE INTESTINE: ICD-10-CM

## 2025-04-14 DIAGNOSIS — K63.5 POLYP OF COLON: ICD-10-CM

## 2025-04-14 DIAGNOSIS — D37.4 NEOPLASM OF UNCERTAIN BEHAVIOR OF COLON: ICD-10-CM

## 2025-04-14 DIAGNOSIS — Z96.649 PRESENCE OF UNSPECIFIED ARTIFICIAL HIP JOINT: ICD-10-CM

## 2025-04-14 DIAGNOSIS — E66.9 OBESITY, UNSPECIFIED: ICD-10-CM

## 2025-04-14 DIAGNOSIS — Z86.0101 PERSONAL HISTORY OF ADENOMATOUS AND SERRATED COLON POLYPS: ICD-10-CM

## 2025-04-14 DIAGNOSIS — I48.0 PAROXYSMAL ATRIAL FIBRILLATION: ICD-10-CM

## 2025-04-14 DIAGNOSIS — I25.10 ATHEROSCLEROTIC HEART DISEASE OF NATIVE CORONARY ARTERY WITHOUT ANGINA PECTORIS: ICD-10-CM

## 2025-04-14 DIAGNOSIS — I10 ESSENTIAL (PRIMARY) HYPERTENSION: ICD-10-CM

## 2025-04-14 DIAGNOSIS — Z88.6 ALLERGY STATUS TO ANALGESIC AGENT: ICD-10-CM

## 2025-04-18 RX ORDER — HEPARIN SODIUM 1000 [USP'U]/ML
5000 INJECTION INTRAVENOUS; SUBCUTANEOUS ONCE
Refills: 0 | Status: COMPLETED | OUTPATIENT
Start: 2025-04-21 | End: 2025-04-21

## 2025-04-18 RX ORDER — ALVIMOPAN 12 MG/1
12 CAPSULE ORAL ONCE
Refills: 0 | Status: COMPLETED | OUTPATIENT
Start: 2025-04-21 | End: 2025-04-21

## 2025-04-18 RX ORDER — CEFOTETAN DISODIUM 1 G
2 VIAL (EA) INJECTION ONCE
Refills: 0 | Status: DISCONTINUED | OUTPATIENT
Start: 2025-04-21 | End: 2025-04-21

## 2025-04-18 NOTE — ASU PATIENT PROFILE, ADULT - FALL HARM RISK - FALL HARM RISK
IV inserted by Tech blew, need to get another IV in order to get fluids. BSMART assessing patient at this time, and would like to wait a little before being stuck again. Surgery

## 2025-04-19 RX ORDER — OXYCODONE HYDROCHLORIDE 30 MG/1
5 TABLET ORAL ONCE
Refills: 0 | Status: DISCONTINUED | OUTPATIENT
Start: 2025-04-21 | End: 2025-04-23

## 2025-04-19 RX ORDER — ONDANSETRON HCL/PF 4 MG/2 ML
4 VIAL (ML) INJECTION ONCE
Refills: 0 | Status: DISCONTINUED | OUTPATIENT
Start: 2025-04-21 | End: 2025-04-23

## 2025-04-19 RX ORDER — FENTANYL CITRATE-0.9 % NACL/PF 100MCG/2ML
50 SYRINGE (ML) INTRAVENOUS
Refills: 0 | Status: DISCONTINUED | OUTPATIENT
Start: 2025-04-21 | End: 2025-04-23

## 2025-04-21 ENCOUNTER — APPOINTMENT (OUTPATIENT)
Dept: COLORECTAL SURGERY | Facility: HOSPITAL | Age: 65
End: 2025-04-21

## 2025-04-21 ENCOUNTER — RESULT REVIEW (OUTPATIENT)
Age: 65
End: 2025-04-21

## 2025-04-21 ENCOUNTER — INPATIENT (INPATIENT)
Facility: HOSPITAL | Age: 65
LOS: 1 days | Discharge: HOME | DRG: 376 | End: 2025-04-23
Attending: COLON & RECTAL SURGERY | Admitting: COLON & RECTAL SURGERY
Payer: COMMERCIAL

## 2025-04-21 VITALS
TEMPERATURE: 97 F | RESPIRATION RATE: 16 BRPM | SYSTOLIC BLOOD PRESSURE: 130 MMHG | OXYGEN SATURATION: 99 % | HEIGHT: 71 IN | HEART RATE: 92 BPM | WEIGHT: 244.93 LBS | DIASTOLIC BLOOD PRESSURE: 77 MMHG

## 2025-04-21 DIAGNOSIS — I48.0 PAROXYSMAL ATRIAL FIBRILLATION: ICD-10-CM

## 2025-04-21 DIAGNOSIS — E66.9 OBESITY, UNSPECIFIED: ICD-10-CM

## 2025-04-21 DIAGNOSIS — Z88.6 ALLERGY STATUS TO ANALGESIC AGENT: ICD-10-CM

## 2025-04-21 DIAGNOSIS — C18.7 MALIGNANT NEOPLASM OF SIGMOID COLON: ICD-10-CM

## 2025-04-21 DIAGNOSIS — C18.2 MALIGNANT NEOPLASM OF ASCENDING COLON: ICD-10-CM

## 2025-04-21 DIAGNOSIS — D72.829 ELEVATED WHITE BLOOD CELL COUNT, UNSPECIFIED: ICD-10-CM

## 2025-04-21 DIAGNOSIS — Z87.442 PERSONAL HISTORY OF URINARY CALCULI: ICD-10-CM

## 2025-04-21 DIAGNOSIS — Z96.643 PRESENCE OF ARTIFICIAL HIP JOINT, BILATERAL: ICD-10-CM

## 2025-04-21 DIAGNOSIS — Z96.641 PRESENCE OF RIGHT ARTIFICIAL HIP JOINT: Chronic | ICD-10-CM

## 2025-04-21 DIAGNOSIS — Z86.0101 PERSONAL HISTORY OF ADENOMATOUS AND SERRATED COLON POLYPS: ICD-10-CM

## 2025-04-21 DIAGNOSIS — E83.39 OTHER DISORDERS OF PHOSPHORUS METABOLISM: ICD-10-CM

## 2025-04-21 DIAGNOSIS — D12.3 BENIGN NEOPLASM OF TRANSVERSE COLON: ICD-10-CM

## 2025-04-21 DIAGNOSIS — Z79.899 OTHER LONG TERM (CURRENT) DRUG THERAPY: ICD-10-CM

## 2025-04-21 DIAGNOSIS — Z96.642 PRESENCE OF LEFT ARTIFICIAL HIP JOINT: Chronic | ICD-10-CM

## 2025-04-21 DIAGNOSIS — Z98.52 VASECTOMY STATUS: Chronic | ICD-10-CM

## 2025-04-21 DIAGNOSIS — I10 ESSENTIAL (PRIMARY) HYPERTENSION: ICD-10-CM

## 2025-04-21 LAB
BLD GP AB SCN SERPL QL: SIGNIFICANT CHANGE UP
GLUCOSE BLDC GLUCOMTR-MCNC: 130 MG/DL — HIGH (ref 70–99)
GLUCOSE BLDC GLUCOMTR-MCNC: 145 MG/DL — HIGH (ref 70–99)
GLUCOSE BLDC GLUCOMTR-MCNC: 180 MG/DL — HIGH (ref 70–99)

## 2025-04-21 PROCEDURE — 83735 ASSAY OF MAGNESIUM: CPT

## 2025-04-21 PROCEDURE — 84100 ASSAY OF PHOSPHORUS: CPT

## 2025-04-21 PROCEDURE — 82962 GLUCOSE BLOOD TEST: CPT

## 2025-04-21 PROCEDURE — 80048 BASIC METABOLIC PNL TOTAL CA: CPT

## 2025-04-21 PROCEDURE — 36415 COLL VENOUS BLD VENIPUNCTURE: CPT

## 2025-04-21 PROCEDURE — 85027 COMPLETE CBC AUTOMATED: CPT

## 2025-04-21 PROCEDURE — 99222 1ST HOSP IP/OBS MODERATE 55: CPT

## 2025-04-21 PROCEDURE — 83036 HEMOGLOBIN GLYCOSYLATED A1C: CPT

## 2025-04-21 PROCEDURE — 88309 TISSUE EXAM BY PATHOLOGIST: CPT | Mod: 26

## 2025-04-21 PROCEDURE — 88305 TISSUE EXAM BY PATHOLOGIST: CPT | Mod: 26

## 2025-04-21 PROCEDURE — 85025 COMPLETE CBC W/AUTO DIFF WBC: CPT

## 2025-04-21 RX ORDER — ONDANSETRON HCL/PF 4 MG/2 ML
4 VIAL (ML) INJECTION EVERY 6 HOURS
Refills: 0 | Status: DISCONTINUED | OUTPATIENT
Start: 2025-04-21 | End: 2025-04-23

## 2025-04-21 RX ORDER — OXYCODONE HYDROCHLORIDE 30 MG/1
10 TABLET ORAL EVERY 4 HOURS
Refills: 0 | Status: DISCONTINUED | OUTPATIENT
Start: 2025-04-21 | End: 2025-04-23

## 2025-04-21 RX ORDER — BUPIVACAINE 13.3 MG/ML
20 INJECTION, SUSPENSION, LIPOSOMAL INFILTRATION ONCE
Refills: 0 | Status: DISCONTINUED | OUTPATIENT
Start: 2025-04-21 | End: 2025-04-23

## 2025-04-21 RX ORDER — ALVIMOPAN 12 MG/1
12 CAPSULE ORAL EVERY 12 HOURS
Refills: 0 | Status: DISCONTINUED | OUTPATIENT
Start: 2025-04-22 | End: 2025-04-22

## 2025-04-21 RX ORDER — ACETAMINOPHEN 500 MG/5ML
650 LIQUID (ML) ORAL EVERY 6 HOURS
Refills: 0 | Status: DISCONTINUED | OUTPATIENT
Start: 2025-04-21 | End: 2025-04-23

## 2025-04-21 RX ORDER — CEFOTETAN DISODIUM 1 G
2 VIAL (EA) INJECTION ONCE
Refills: 0 | Status: COMPLETED | OUTPATIENT
Start: 2025-04-21 | End: 2025-04-21

## 2025-04-21 RX ORDER — OXYCODONE HYDROCHLORIDE 30 MG/1
5 TABLET ORAL EVERY 4 HOURS
Refills: 0 | Status: DISCONTINUED | OUTPATIENT
Start: 2025-04-21 | End: 2025-04-23

## 2025-04-21 RX ORDER — AMLODIPINE BESYLATE 10 MG/1
10 TABLET ORAL DAILY
Refills: 0 | Status: DISCONTINUED | OUTPATIENT
Start: 2025-04-21 | End: 2025-04-23

## 2025-04-21 RX ORDER — POTASSIUM CHLORIDE, DEXTROSE MONOHYDRATE AND SODIUM CHLORIDE 150; 5; 900 MG/100ML; G/100ML; MG/100ML
1000 INJECTION, SOLUTION INTRAVENOUS
Refills: 0 | Status: DISCONTINUED | OUTPATIENT
Start: 2025-04-21 | End: 2025-04-22

## 2025-04-21 RX ORDER — LOSARTAN POTASSIUM 100 MG/1
50 TABLET, FILM COATED ORAL DAILY
Refills: 0 | Status: DISCONTINUED | OUTPATIENT
Start: 2025-04-21 | End: 2025-04-21

## 2025-04-21 RX ORDER — ACETAMINOPHEN 500 MG/5ML
1000 LIQUID (ML) ORAL EVERY 6 HOURS
Refills: 0 | Status: COMPLETED | OUTPATIENT
Start: 2025-04-21 | End: 2025-04-22

## 2025-04-21 RX ORDER — ENOXAPARIN SODIUM 100 MG/ML
40 INJECTION SUBCUTANEOUS EVERY 24 HOURS
Refills: 0 | Status: DISCONTINUED | OUTPATIENT
Start: 2025-04-22 | End: 2025-04-23

## 2025-04-21 RX ORDER — METOPROLOL SUCCINATE 50 MG/1
100 TABLET, EXTENDED RELEASE ORAL DAILY
Refills: 0 | Status: DISCONTINUED | OUTPATIENT
Start: 2025-04-21 | End: 2025-04-23

## 2025-04-21 RX ADMIN — HEPARIN SODIUM 5000 UNIT(S): 1000 INJECTION INTRAVENOUS; SUBCUTANEOUS at 06:43

## 2025-04-21 RX ADMIN — Medication 5 MILLIGRAM(S): at 22:01

## 2025-04-21 RX ADMIN — ALVIMOPAN 12 MILLIGRAM(S): 12 CAPSULE ORAL at 06:43

## 2025-04-21 RX ADMIN — POTASSIUM CHLORIDE, DEXTROSE MONOHYDRATE AND SODIUM CHLORIDE 125 MILLILITER(S): 150; 5; 900 INJECTION, SOLUTION INTRAVENOUS at 13:06

## 2025-04-21 RX ADMIN — Medication 400 MILLIGRAM(S): at 16:42

## 2025-04-21 RX ADMIN — Medication 3 MILLILITER(S): at 21:55

## 2025-04-21 RX ADMIN — Medication 100 GRAM(S): at 22:00

## 2025-04-21 RX ADMIN — Medication 400 MILLIGRAM(S): at 22:02

## 2025-04-21 RX ADMIN — POTASSIUM CHLORIDE, DEXTROSE MONOHYDRATE AND SODIUM CHLORIDE 125 MILLILITER(S): 150; 5; 900 INJECTION, SOLUTION INTRAVENOUS at 21:57

## 2025-04-21 NOTE — CONSULT NOTE ADULT - SUBJECTIVE AND OBJECTIVE BOX
PCP: Dr Marianela Castle    CHIEF COMPLAINT: colon ca    HISTORY OF THE PRESENT ILLNESS: this is a 65 yo male with PMH: unstable angina, HTN, Obesity, Diverticulosis, renal calculi, remote h/o PAF found on Holter monitor, placed on Toprol, not on any AC, infrequent presyncope/ syncope, who had a recent colonoscopy, with findings of mass in his sigmoid colon, bx + for adenocarcinoma, pt was then referred to Dr Springer, surgery was planned.  Pt is now in PACU, S/P hand assisted colectomy with omentectomy.  He is seen in PACU, awake, alert, VSS, states his current pain level is 2/10, denies any cp or sob.  We are consulted for medical management    PMH: as above  PSH: H/O vasectomy, B/L THR  FAMILY HISTORY:  FH: myocardial infarction  father  Social History: never a smoker, denies any ETOH or illicit drug use    Allergies:   aspirin (Other)  ibuprofen (Other)    Home Medications:  amLODIPine 10 mg oral tablet: 1 tab(s) orally once a day (21 Apr 2025 06:15)  irbesartan 150 mg oral tablet: 1 tab(s) orally once a day (21 Apr 2025 06:15)  Toprol- mg oral tablet, extended release: 1 tab(s) orally once a day (21 Apr 2025 06:15)  zolpidem 12.5 mg oral tablet, extended release: 1 tab(s) orally once a day (at bedtime) (21 Apr 2025 06:15)      Vital Signs Last 24 Hrs  T(C): 36.9 (21 Apr 2025 10:26), Max: 36.9 (21 Apr 2025 10:26)  T(F): 98.5 (21 Apr 2025 10:26), Max: 98.5 (21 Apr 2025 10:26)  HR: 90 (21 Apr 2025 12:30) (79 - 92)  BP: 154/72 (21 Apr 2025 12:00) (130/77 - 154/72)  BP(mean): --  RR: 14 (21 Apr 2025 12:30) (14 - 16)  SpO2: 100% (21 Apr 2025 12:30) (98% - 100%)    Parameters below as of 21 Apr 2025 12:30  Patient On (Oxygen Delivery Method): nasal cannula        REVIEW OF SYSTEMS:   All 10 systems reviewed in detailed and found to be negative with the exception of what has already been described above    MEDICATIONS  (STANDING):  amLODIPine   Tablet 10 milliGRAM(s) Oral daily  BUpivacaine liposome 1.3% Injectable (no eMAR) 20 milliLiter(s) Local Injection once  dextrose 5% + sodium chloride 0.9% with potassium chloride 20 mEq/L 1000 milliLiter(s) (125 mL/Hr) IV Continuous <Continuous>  metoprolol succinate  milliGRAM(s) Oral daily    MEDICATIONS  (PRN):  zolpidem 5 milliGRAM(s) Oral at bedtime PRN Insomnia    PHYSICAL EXAM:    GENERAL: Comfortable, no acute distress   HEAD:  Normocephalic, atraumatic  EYES: EOMI, PERRLA  HEENT: Moist mucous membranes  NECK: Supple, No JVD  NERVOUS SYSTEM:  Alert & Oriented X3, Motor Strength 5/5 B/L upper and lower extremities  CHEST/LUNG: Clear to auscultation bilaterally  HEART: Regular rate and rhythm  ABDOMEN: Soft, obese, mild postop tenderness,  Nondistended, Bowel sounds absent, NP seal and lap sites c/d/i  GENITOURINARY: brown,   EXTREMITIES:   No clubbing, cyanosis, or edema  MUSCULOSKELETAL- No muscle tenderness, no joint tenderness  SKIN-no rash    LABS: preop lab reviewed          IMPRESSION: this is a 65 yo male with PMH: unstable angina, HTN, Obesity, Diverticulosis, renal calculi, remote h/o PAF found on Holter monitor, placed on Toprol, not on any AC, infrequent presyncope/ syncope, who had a recent colonoscopy, with findings of mass in his sigmoid colon, bx + for adenocarcinoma, pt was then referred to Dr Springer, surgery was planned.  Pt is now in PACU, S/P hand assisted colectomy with omentectomy.  .  We are consulted for medical management    # Colon Ca  # s/P  hand assited colectomy with omentectomy  pain control  IVF's  brown  Monitor I& O  Monitor Cbc, BMP  BGM per CRS protocol  Cont Abxs  diet per CRS  Enterag    # HTN  cont BB, amlodipine, hold losartan to prevent Postop hypotension  Dash diet    # Afib, remote h/o  medical clearance states EKG with NSR  not on any AC    # obesity, BMI 34.2    dietary/lifestyle modifications    #VTE prophylaxis  lovenox  Venodynes  Amb    Thnak you for the consult, will follow           PCP: Dr Marianela Castle    CHIEF COMPLAINT: colon ca    HISTORY OF THE PRESENT ILLNESS: this is a 65 yo male with PMH: unstable angina, HTN, Obesity, Diverticulosis, renal calculi, remote h/o PAF 30 years ago,  placed on Toprol, not on any AC,  who had a recent colonoscopy, with findings of mass in his sigmoid colon, bx + for adenocarcinoma, pt was then referred to Dr Springer, surgery was planned.  Pt is now in PACU, S/P hand assisted colectomy with omentectomy.  He is seen in PACU, awake, alert, VSS, states his current pain level is 2/10, denies any cp or sob.  We are consulted for medical management    PMH: as above  PSH: H/O vasectomy, B/L THR  FAMILY HISTORY:  FH: myocardial infarction  father  Social History: never a smoker, denies any ETOH or illicit drug use    Allergies:   aspirin (Other)  ibuprofen (Other)    Home Medications:  amLODIPine 10 mg oral tablet: 1 tab(s) orally once a day (21 Apr 2025 06:15)  irbesartan 150 mg oral tablet: 1 tab(s) orally once a day (21 Apr 2025 06:15)  Toprol- mg oral tablet, extended release: 1 tab(s) orally once a day (21 Apr 2025 06:15)  zolpidem 12.5 mg oral tablet, extended release: 1 tab(s) orally once a day (at bedtime) (21 Apr 2025 06:15)      Vital Signs Last 24 Hrs  T(C): 36.9 (21 Apr 2025 10:26), Max: 36.9 (21 Apr 2025 10:26)  T(F): 98.5 (21 Apr 2025 10:26), Max: 98.5 (21 Apr 2025 10:26)  HR: 90 (21 Apr 2025 12:30) (79 - 92)  BP: 154/72 (21 Apr 2025 12:00) (130/77 - 154/72)  BP(mean): --  RR: 14 (21 Apr 2025 12:30) (14 - 16)  SpO2: 100% (21 Apr 2025 12:30) (98% - 100%)    Parameters below as of 21 Apr 2025 12:30  Patient On (Oxygen Delivery Method): nasal cannula        REVIEW OF SYSTEMS:   All 10 systems reviewed in detailed and found to be negative with the exception of what has already been described above    MEDICATIONS  (STANDING):  amLODIPine   Tablet 10 milliGRAM(s) Oral daily  BUpivacaine liposome 1.3% Injectable (no eMAR) 20 milliLiter(s) Local Injection once  dextrose 5% + sodium chloride 0.9% with potassium chloride 20 mEq/L 1000 milliLiter(s) (125 mL/Hr) IV Continuous <Continuous>  metoprolol succinate  milliGRAM(s) Oral daily    MEDICATIONS  (PRN):  zolpidem 5 milliGRAM(s) Oral at bedtime PRN Insomnia    PHYSICAL EXAM:    GENERAL: Comfortable, no acute distress   HEAD:  Normocephalic, atraumatic  EYES: EOMI, PERRLA  HEENT: Moist mucous membranes  NECK: Supple, No JVD  NERVOUS SYSTEM:  Alert & Oriented X3, Motor Strength 5/5 B/L upper and lower extremities  CHEST/LUNG: Clear to auscultation bilaterally  HEART: Regular rate and rhythm  ABDOMEN: Soft, obese, mild postop tenderness,  Nondistended, Bowel sounds absent, NP seal and lap sites c/d/i  GENITOURINARY: brown,   EXTREMITIES:   No clubbing, cyanosis, or edema  MUSCULOSKELETAL- No muscle tenderness, no joint tenderness  SKIN-no rash    LABS: preop lab reviewed          IMPRESSION: this is a 65 yo male with PMH: unstable angina, HTN, Obesity, Diverticulosis, renal calculi, remote h/o PAF found on Holter monitor, placed on Toprol, not on any AC,  who had a recent colonoscopy, with findings of mass in his sigmoid colon, bx + for adenocarcinoma, pt was then referred to Dr Springer, surgery was planned.  Pt is now in PACU, S/P hand assisted colectomy with omentectomy.  .  We are consulted for medical management    # Colon Ca  # s/P  hand assited colectomy with omentectomy  pain control  IVF's  brown  Monitor I& O  Monitor Cbc, BMP  BGM per CRS protocol  Cont Abxs  diet per CRS  Enterag    # HTN  cont BB, amlodipine, hold losartan to prevent Postop hypotension  Dash diet    # Afib, remote h/o  per pt 30 years ago  medical clearance states EKG with NSR  not on any AC    # obesity, BMI 34.2    dietary/lifestyle modifications    #VTE prophylaxis  lovenox  Venodynes  Amb    Thnak you for the consult, will follow

## 2025-04-21 NOTE — PATIENT PROFILE ADULT - OVER THE PAST TWO WEEKS HAVE YOU FELT DOWN, DEPRESSED OR HOPELESS?
Problem: Adult Inpatient Plan of Care  Goal: Plan of Care Review  Outcome: Ongoing (interventions implemented as appropriate)  Pt tolerated IVIG well, with no complications or s/s of adverse reaction. VS stable throughout infusion. Left hand PIV positive for blood return, SL and removed prior to discharge. Catheter tip intact. Pt discharged with no distress noted and ambulated independently out of clinic accompanied by wife. RTC 3/28/19, pt and wife verbalized understanding. AVS printed and given to pt.        no

## 2025-04-21 NOTE — CONSULT NOTE ADULT - NS ATTEND AMEND GEN_ALL_CORE FT
Patient seen and examined with SELVIN Saleem.  I was physically present for the key portions of the evaluation and management (E/M) service provided.  I agree with the above history, physical, and plan which I have reviewed and edited where appropriate.   this is a 63 yo male with PMH: unstable angina, HTN, Obesity, Diverticulosis, renal calculi, remote h/o PAF found on Holter monitor, placed on Toprol, not on any AC,  who had a recent colonoscopy, with findings of mass in his sigmoid colon, bx + for adenocarcinoma, pt was then referred to Dr Springer, surgery was planned.  Pt is now in PACU, S/P hand assisted colectomy with omentectomy.  .  We are consulted for medical management    resp - cta b/l  abd - soft + dressing in place      # Colon Ca  # s/P  hand assited colectomy with omentectomy  pOD 0  case d/w colorectal team  pain control  IVF's  brown  Monitor I& O  Monitor Cbc, BMP  BGM per CRS protocol  Cont Abxs  diet per CRS  Enterag    # HTN  cont BB, amlodipine, hold losartan to prevent Postop hypotension  Dash diet    # Afib, remote h/o  per pt 30 years ago  medical clearance states EKG with NSR  not on any AC    # obesity, BMI 34.2    dietary/lifestyle modifications    #VTE prophylaxis  lovenox  Venodynes  Amb    Manish you for the consult, will follow

## 2025-04-21 NOTE — BRIEF OPERATIVE NOTE - NSICDXBRIEFPROCEDURE_GEN_ALL_CORE_FT
PROCEDURES:  Hand assisted laparoscopic left colectomy 21-Apr-2025 10:11:37  Eugene Motta  Mobilization of splenic flexure 21-Apr-2025 10:11:49  Eugene Motta  Rigid proctosigmoidoscpy 21-Apr-2025 10:12:03  Eugene Motta  Omentectomy 21-Apr-2025 10:12:08  Eugene Motta

## 2025-04-21 NOTE — BRIEF OPERATIVE NOTE - COMMENTS
I, MANDO Motta, was present with Dr. Beatty for the entirety of the procedure, assisting during all key portions of the procedure, including the  skin closure aspect. For further details, please refer to his operative dictation.

## 2025-04-22 LAB
A1C WITH ESTIMATED AVERAGE GLUCOSE RESULT: 5.6 % — SIGNIFICANT CHANGE UP (ref 4–5.6)
ANION GAP SERPL CALC-SCNC: 4 MMOL/L — LOW (ref 5–17)
BUN SERPL-MCNC: 12 MG/DL — SIGNIFICANT CHANGE UP (ref 7–23)
CALCIUM SERPL-MCNC: 8.9 MG/DL — SIGNIFICANT CHANGE UP (ref 8.5–10.1)
CHLORIDE SERPL-SCNC: 114 MMOL/L — HIGH (ref 96–108)
CO2 SERPL-SCNC: 24 MMOL/L — SIGNIFICANT CHANGE UP (ref 22–31)
CREAT SERPL-MCNC: 1.16 MG/DL — SIGNIFICANT CHANGE UP (ref 0.5–1.3)
EGFR: 71 ML/MIN/1.73M2 — SIGNIFICANT CHANGE UP
EGFR: 71 ML/MIN/1.73M2 — SIGNIFICANT CHANGE UP
ESTIMATED AVERAGE GLUCOSE: 114 MG/DL — SIGNIFICANT CHANGE UP (ref 68–114)
GLUCOSE BLDC GLUCOMTR-MCNC: 120 MG/DL — HIGH (ref 70–99)
GLUCOSE SERPL-MCNC: 133 MG/DL — HIGH (ref 70–99)
HCT VFR BLD CALC: 38.9 % — LOW (ref 39–50)
HGB BLD-MCNC: 13.2 G/DL — SIGNIFICANT CHANGE UP (ref 13–17)
MAGNESIUM SERPL-MCNC: 2 MG/DL — SIGNIFICANT CHANGE UP (ref 1.6–2.6)
MCHC RBC-ENTMCNC: 29 PG — SIGNIFICANT CHANGE UP (ref 27–34)
MCHC RBC-ENTMCNC: 33.9 G/DL — SIGNIFICANT CHANGE UP (ref 32–36)
MCV RBC AUTO: 85.5 FL — SIGNIFICANT CHANGE UP (ref 80–100)
NRBC # BLD AUTO: 0 K/UL — SIGNIFICANT CHANGE UP (ref 0–0)
NRBC # FLD: 0 K/UL — SIGNIFICANT CHANGE UP (ref 0–0)
NRBC BLD AUTO-RTO: 0 /100 WBCS — SIGNIFICANT CHANGE UP (ref 0–0)
PHOSPHATE SERPL-MCNC: 2.4 MG/DL — LOW (ref 2.5–4.5)
PLATELET # BLD AUTO: 204 K/UL — SIGNIFICANT CHANGE UP (ref 150–400)
PMV BLD: 9.3 FL — SIGNIFICANT CHANGE UP (ref 7–13)
POTASSIUM SERPL-MCNC: 3.9 MMOL/L — SIGNIFICANT CHANGE UP (ref 3.5–5.3)
POTASSIUM SERPL-SCNC: 3.9 MMOL/L — SIGNIFICANT CHANGE UP (ref 3.5–5.3)
RBC # BLD: 4.55 M/UL — SIGNIFICANT CHANGE UP (ref 4.2–5.8)
RBC # FLD: 13.3 % — SIGNIFICANT CHANGE UP (ref 10.3–14.5)
SODIUM SERPL-SCNC: 142 MMOL/L — SIGNIFICANT CHANGE UP (ref 135–145)
WBC # BLD: 14.58 K/UL — HIGH (ref 3.8–10.5)
WBC # FLD AUTO: 14.58 K/UL — HIGH (ref 3.8–10.5)

## 2025-04-22 PROCEDURE — 99232 SBSQ HOSP IP/OBS MODERATE 35: CPT

## 2025-04-22 RX ORDER — LOSARTAN POTASSIUM 100 MG/1
50 TABLET, FILM COATED ORAL DAILY
Refills: 0 | Status: DISCONTINUED | OUTPATIENT
Start: 2025-04-22 | End: 2025-04-23

## 2025-04-22 RX ORDER — SOD PHOS DI, MONO/K PHOS MONO 250 MG
2 TABLET ORAL ONCE
Refills: 0 | Status: COMPLETED | OUTPATIENT
Start: 2025-04-22 | End: 2025-04-22

## 2025-04-22 RX ORDER — POTASSIUM CHLORIDE, DEXTROSE MONOHYDRATE AND SODIUM CHLORIDE 150; 5; 900 MG/100ML; G/100ML; MG/100ML
1000 INJECTION, SOLUTION INTRAVENOUS
Refills: 0 | Status: DISCONTINUED | OUTPATIENT
Start: 2025-04-22 | End: 2025-04-23

## 2025-04-22 RX ADMIN — Medication 1000 MILLIGRAM(S): at 10:42

## 2025-04-22 RX ADMIN — ALVIMOPAN 12 MILLIGRAM(S): 12 CAPSULE ORAL at 10:13

## 2025-04-22 RX ADMIN — Medication 400 MILLIGRAM(S): at 05:56

## 2025-04-22 RX ADMIN — POTASSIUM CHLORIDE, DEXTROSE MONOHYDRATE AND SODIUM CHLORIDE 75 MILLILITER(S): 150; 5; 900 INJECTION, SOLUTION INTRAVENOUS at 17:11

## 2025-04-22 RX ADMIN — AMLODIPINE BESYLATE 10 MILLIGRAM(S): 10 TABLET ORAL at 10:13

## 2025-04-22 RX ADMIN — Medication 3 MILLILITER(S): at 23:00

## 2025-04-22 RX ADMIN — Medication 3 MILLILITER(S): at 15:13

## 2025-04-22 RX ADMIN — Medication 2 PACKET(S): at 11:57

## 2025-04-22 RX ADMIN — ENOXAPARIN SODIUM 40 MILLIGRAM(S): 100 INJECTION SUBCUTANEOUS at 05:56

## 2025-04-22 RX ADMIN — METOPROLOL SUCCINATE 100 MILLIGRAM(S): 50 TABLET, EXTENDED RELEASE ORAL at 10:13

## 2025-04-22 RX ADMIN — LOSARTAN POTASSIUM 50 MILLIGRAM(S): 100 TABLET, FILM COATED ORAL at 16:01

## 2025-04-22 RX ADMIN — Medication 5 MILLIGRAM(S): at 23:43

## 2025-04-22 RX ADMIN — Medication 400 MILLIGRAM(S): at 10:12

## 2025-04-22 RX ADMIN — Medication 650 MILLIGRAM(S): at 21:28

## 2025-04-22 RX ADMIN — Medication 3 MILLILITER(S): at 05:50

## 2025-04-22 NOTE — PROGRESS NOTE ADULT - ATTENDING COMMENTS
SE  No complaints.  Rolan regular diet, passing gas and stool.  AFVSS  NAD  soft, NTND  Labs reviewed  A/P -   Doing well. Cont current care.  Likely home tomorrow.

## 2025-04-22 NOTE — PROGRESS NOTE ADULT - SUBJECTIVE AND OBJECTIVE BOX
PCP: Dr Marianela Castle    CHIEF COMPLAINT: colon ca    HISTORY OF THE PRESENT ILLNESS: this is a 65 yo male with PMH: unstable angina, HTN, Obesity, Diverticulosis, renal calculi, remote h/o PAF 30 years ago,  placed on Toprol, not on any AC,  who had a recent colonoscopy, with findings of mass in his sigmoid colon, bx + for adenocarcinoma, pt was then referred to Dr Springer, surgery was planned.  Pt is now S/P hand assisted colectomy with omentectomy.    We are consulted for medical management    pt seen OOB in room in recliner, awake, alert comfortable, states he did have 1 sm BM, sarah po, no n/v, has been up walking in [place] without any dizziness or lightheadedness, no cp or sob, brown removed, + voiding      REVIEW OF SYSTEMS:   All 10 systems reviewed in detailed and found to be negative with the exception of what has already been described above    MEDICATIONS  (STANDING):  alvimopan 12 milliGRAM(s) Oral every 12 hours  amLODIPine   Tablet 10 milliGRAM(s) Oral daily  BUpivacaine liposome 1.3% Injectable (no eMAR) 20 milliLiter(s) Local Injection once  dextrose 5% + sodium chloride 0.9% with potassium chloride 20 mEq/L 1000 milliLiter(s) (75 mL/Hr) IV Continuous <Continuous>  enoxaparin Injectable 40 milliGRAM(s) SubCutaneous every 24 hours  metoprolol succinate  milliGRAM(s) Oral daily  sodium chloride 0.9% lock flush 3 milliLiter(s) IV Push every 8 hours    MEDICATIONS  (PRN):  acetaminophen     Tablet .. 650 milliGRAM(s) Oral every 6 hours PRN Temp greater or equal to 38C (100.4F), Mild Pain (1 - 3)  fentaNYL    Injectable 50 MICROGram(s) IV Push every 10 minutes PRN Severe Pain (7 - 10)  ondansetron Injectable 4 milliGRAM(s) IV Push every 6 hours PRN Nausea and/or Vomiting  ondansetron Injectable 4 milliGRAM(s) IV Push once PRN Nausea and/or Vomiting  oxyCODONE    IR 5 milliGRAM(s) Oral every 4 hours PRN Moderate Pain (4 - 6)  oxyCODONE    IR 10 milliGRAM(s) Oral every 4 hours PRN Severe Pain (7 - 10)  zolpidem 5 milliGRAM(s) Oral at bedtime PRN Insomnia    PHYSICAL EXAM:    Vital Signs Last 24 Hrs  T(C): 36.7 (22 Apr 2025 11:43), Max: 36.8 (21 Apr 2025 14:03)  T(F): 98 (22 Apr 2025 11:43), Max: 98.3 (21 Apr 2025 14:03)  HR: 89 (22 Apr 2025 11:43) (80 - 91)  BP: 154/69 (22 Apr 2025 11:43) (140/68 - 163/73)  BP(mean): --  RR: 18 (22 Apr 2025 11:43) (14 - 18)  SpO2: 98% (22 Apr 2025 11:43) (96% - 98%)    Parameters below as of 22 Apr 2025 11:43  Patient On (Oxygen Delivery Method): room air        GENERAL: Comfortable, no acute distress   HEAD:  Normocephalic, atraumatic  EYES: EOMI, PERRLA  HEENT: Moist mucous membranes  NECK: Supple, No JVD  NERVOUS SYSTEM:  Alert & Oriented X3, Motor Strength 5/5 B/L upper and lower extremities  CHEST/LUNG: Clear to auscultation bilaterally  HEART: Regular rate and rhythm  ABDOMEN: Soft, obese, mild postop tenderness,  Nondistended, Bowel sounds hypoactive, NP seal and lap sites c/d/i, + BM  GENITOURINARY: brown, removed, + voiding  EXTREMITIES:   No clubbing, cyanosis, or edema  MUSCULOSKELETAL- No muscle tenderness, no joint tenderness  SKIN-no rash    LABS:                         13.2   14.58 )-----------( 204      ( 22 Apr 2025 07:30 )             38.9       04-22    142  |  114[H]  |  12  ----------------------------<  133[H]  3.9   |  24  |  1.16    Ca    8.9      22 Apr 2025 07:30  Phos  2.4     04-22  Mg     2.0     04-22      IMPRESSION: this is a 65 yo male with PMH: unstable angina, HTN, Obesity, Diverticulosis, renal calculi, remote h/o PAF found on Holter monitor, placed on Toprol, not on any AC,  who had a recent colonoscopy, with findings of mass in his sigmoid colon, bx + for adenocarcinoma, pt was then referred to Dr Springer, surgery was planned.  Pt is now S/P hand assisted colectomy with omentectomy.    We are consulted for medical management    # Colon Ca  # S/P  hand assisted colectomy with omentectomy, POD#1  pain control with Oxycodone 5/10mg  IVF's  brown  Monitor I& O  Monitor Cbc, BMP  BGM per CRS protocol  Cont Abxs  diet per CRS  Enterag    # HTN  SBP > 150  cont BB, amlodipine,   resume arb: therapeutic exchange for irbesartan 150 mg is Losartan 50 mg   Dash diet    # Afib, remote h/o  per pt 30 years ago  medical clearance states EKG with NSR  not on any AC    # obesity, BMI 34.2    dietary/lifestyle modifications    #VTE prophylaxis  lovenox  Venodynes  Amb    dispo: likely home tomorrow         PCP: Dr Marianela Castle    CHIEF COMPLAINT: colon ca    HISTORY OF THE PRESENT ILLNESS: this is a 65 yo male with PMH: unstable angina, HTN, Obesity, Diverticulosis, renal calculi, remote h/o PAF 30 years ago,  placed on Toprol, not on any AC,  who had a recent colonoscopy, with findings of mass in his sigmoid colon, bx + for adenocarcinoma, pt was then referred to Dr Springer, surgery was planned.  Pt is now S/P hand assisted colectomy with omentectomy.    We are consulted for medical management    pt seen OOB in room in recliner, awake, alert comfortable, states he did have 1 sm BM, sarah po, no n/v, has been up walking in [place] without any dizziness or lightheadedness, no cp or sob, brown removed, + voiding      REVIEW OF SYSTEMS:   All 10 systems reviewed in detailed and found to be negative with the exception of what has already been described above    MEDICATIONS  (STANDING):  alvimopan 12 milliGRAM(s) Oral every 12 hours  amLODIPine   Tablet 10 milliGRAM(s) Oral daily  BUpivacaine liposome 1.3% Injectable (no eMAR) 20 milliLiter(s) Local Injection once  dextrose 5% + sodium chloride 0.9% with potassium chloride 20 mEq/L 1000 milliLiter(s) (75 mL/Hr) IV Continuous <Continuous>  enoxaparin Injectable 40 milliGRAM(s) SubCutaneous every 24 hours  metoprolol succinate  milliGRAM(s) Oral daily  sodium chloride 0.9% lock flush 3 milliLiter(s) IV Push every 8 hours    MEDICATIONS  (PRN):  acetaminophen     Tablet .. 650 milliGRAM(s) Oral every 6 hours PRN Temp greater or equal to 38C (100.4F), Mild Pain (1 - 3)  fentaNYL    Injectable 50 MICROGram(s) IV Push every 10 minutes PRN Severe Pain (7 - 10)  ondansetron Injectable 4 milliGRAM(s) IV Push every 6 hours PRN Nausea and/or Vomiting  ondansetron Injectable 4 milliGRAM(s) IV Push once PRN Nausea and/or Vomiting  oxyCODONE    IR 5 milliGRAM(s) Oral every 4 hours PRN Moderate Pain (4 - 6)  oxyCODONE    IR 10 milliGRAM(s) Oral every 4 hours PRN Severe Pain (7 - 10)  zolpidem 5 milliGRAM(s) Oral at bedtime PRN Insomnia    PHYSICAL EXAM:    Vital Signs Last 24 Hrs  T(C): 36.7 (22 Apr 2025 11:43), Max: 36.8 (21 Apr 2025 14:03)  T(F): 98 (22 Apr 2025 11:43), Max: 98.3 (21 Apr 2025 14:03)  HR: 89 (22 Apr 2025 11:43) (80 - 91)  BP: 154/69 (22 Apr 2025 11:43) (140/68 - 163/73)  BP(mean): --  RR: 18 (22 Apr 2025 11:43) (14 - 18)  SpO2: 98% (22 Apr 2025 11:43) (96% - 98%)    Parameters below as of 22 Apr 2025 11:43  Patient On (Oxygen Delivery Method): room air        GENERAL: Comfortable, no acute distress   HEAD:  Normocephalic, atraumatic  EYES: EOMI, PERRLA  HEENT: Moist mucous membranes  NECK: Supple, No JVD  NERVOUS SYSTEM:  Alert & Oriented X3, Motor Strength 5/5 B/L upper and lower extremities  CHEST/LUNG: Clear to auscultation bilaterally  HEART: Regular rate and rhythm  ABDOMEN: Soft, obese, mild postop tenderness,  Nondistended, Bowel sounds hypoactive, NP seal and lap sites c/d/i, + BM  GENITOURINARY: brown, removed, + voiding  EXTREMITIES:   No clubbing, cyanosis, or edema  MUSCULOSKELETAL- No muscle tenderness, no joint tenderness  SKIN-no rash    LABS:                         13.2   14.58 )-----------( 204      ( 22 Apr 2025 07:30 )             38.9       04-22    142  |  114[H]  |  12  ----------------------------<  133[H]  3.9   |  24  |  1.16    Ca    8.9      22 Apr 2025 07:30  Phos  2.4     04-22  Mg     2.0     04-22      IMPRESSION: this is a 65 yo male with PMH: unstable angina, HTN, Obesity, Diverticulosis, renal calculi, remote h/o PAF found on Holter monitor, placed on Toprol, not on any AC,  who had a recent colonoscopy, with findings of mass in his sigmoid colon, bx + for adenocarcinoma, pt was then referred to Dr Springer, surgery was planned.  Pt is now S/P hand assisted colectomy with omentectomy.    We are consulted for medical management    # Colon Ca  # S/P  hand assisted colectomy with omentectomy, POD#1  pain control with Oxycodone 5/10mg  IVF's  brown  Monitor I& O  Monitor Cbc, BMP  BGM per CRS protocol  Cont Abxs  diet per CRS  Enterag    # leukocytosis  likely reactive  no s/s of infn, pt is afebrile    # HTN  SBP > 150  cont BB, amlodipine,   resume arb: therapeutic exchange for irbesartan 150 mg is Losartan 50 mg   Dash diet    # Afib, remote h/o  per pt 30 years ago  medical clearance states EKG with NSR  not on any AC    # obesity, BMI 34.2    dietary/lifestyle modifications    #VTE prophylaxis  lovenox  Venodynes  Amb    dispo: likely home tomorrow

## 2025-04-22 NOTE — PROGRESS NOTE ADULT - SUBJECTIVE AND OBJECTIVE BOX
[facility]   SURGERY DAILY PROGRESS NOTE    Subjective:  Patient seen and examined on morning rounds. AVSS; denies n/v; sarah diet      MEDICATIONS  (STANDING):  acetaminophen   IVPB .. 1000 milliGRAM(s) IV Intermittent every 6 hours  alvimopan 12 milliGRAM(s) Oral every 12 hours  amLODIPine   Tablet 10 milliGRAM(s) Oral daily  BUpivacaine liposome 1.3% Injectable (no eMAR) 20 milliLiter(s) Local Injection once  dextrose 5% + sodium chloride 0.9% with potassium chloride 20 mEq/L 1000 milliLiter(s) (125 mL/Hr) IV Continuous <Continuous>  enoxaparin Injectable 40 milliGRAM(s) SubCutaneous every 24 hours  metoprolol succinate  milliGRAM(s) Oral daily  sodium chloride 0.9% lock flush 3 milliLiter(s) IV Push every 8 hours    MEDICATIONS  (PRN):  acetaminophen     Tablet .. 650 milliGRAM(s) Oral every 6 hours PRN Temp greater or equal to 38C (100.4F), Mild Pain (1 - 3)  fentaNYL    Injectable 50 MICROGram(s) IV Push every 10 minutes PRN Severe Pain (7 - 10)  ondansetron Injectable 4 milliGRAM(s) IV Push every 6 hours PRN Nausea and/or Vomiting  ondansetron Injectable 4 milliGRAM(s) IV Push once PRN Nausea and/or Vomiting  oxyCODONE    IR 5 milliGRAM(s) Oral once PRN Moderate Pain (4 - 6)  oxyCODONE    IR 5 milliGRAM(s) Oral every 4 hours PRN Moderate Pain (4 - 6)  oxyCODONE    IR 10 milliGRAM(s) Oral every 4 hours PRN Severe Pain (7 - 10)  zolpidem 5 milliGRAM(s) Oral at bedtime PRN Insomnia    Vital Signs Last 24 Hrs  T(C): 36.4 (21 Apr 2025 16:03), Max: 36.9 (21 Apr 2025 10:26)  T(F): 97.6 (21 Apr 2025 16:03), Max: 98.5 (21 Apr 2025 10:26)  HR: 89 (21 Apr 2025 16:03) (79 - 92)  BP: 151/77 (21 Apr 2025 16:03) (130/77 - 154/72)  BP(mean): --  RR: 18 (21 Apr 2025 16:03) (14 - 18)  SpO2: 98% (21 Apr 2025 16:03) (97% - 100%)    Parameters below as of 21 Apr 2025 16:03  Patient On (Oxygen Delivery Method): room air      I&O's Detail    21 Apr 2025 07:01  -  22 Apr 2025 00:44  --------------------------------------------------------  IN:    Other (mL): 3000 mL  Total IN: 3000 mL    OUT:    Indwelling Catheter - Urethral (mL): 1060 mL  Total OUT: 1060 mL    Total NET: 1940 mL        Daily Height in cm: 180.34 (21 Apr 2025 06:15)    Daily     LABS:                Physical Exam:   Gen: NAD  CVS: RRR  Respiratory: CTA B/L  Abdomen: soft, non-distended, kem TTP; incisions are c/d/i  Ext: no edema, gross sensation intact, gross motor function intact

## 2025-04-23 ENCOUNTER — TRANSCRIPTION ENCOUNTER (OUTPATIENT)
Age: 65
End: 2025-04-23

## 2025-04-23 VITALS
DIASTOLIC BLOOD PRESSURE: 71 MMHG | RESPIRATION RATE: 18 BRPM | SYSTOLIC BLOOD PRESSURE: 148 MMHG | OXYGEN SATURATION: 99 % | HEART RATE: 83 BPM | TEMPERATURE: 98 F

## 2025-04-23 LAB
ANION GAP SERPL CALC-SCNC: 4 MMOL/L — LOW (ref 5–17)
BASOPHILS # BLD AUTO: 0.04 K/UL — SIGNIFICANT CHANGE UP (ref 0–0.2)
BASOPHILS NFR BLD AUTO: 0.3 % — SIGNIFICANT CHANGE UP (ref 0–2)
BUN SERPL-MCNC: 8 MG/DL — SIGNIFICANT CHANGE UP (ref 7–23)
CALCIUM SERPL-MCNC: 8.8 MG/DL — SIGNIFICANT CHANGE UP (ref 8.5–10.1)
CHLORIDE SERPL-SCNC: 110 MMOL/L — HIGH (ref 96–108)
CO2 SERPL-SCNC: 26 MMOL/L — SIGNIFICANT CHANGE UP (ref 22–31)
CREAT SERPL-MCNC: 0.9 MG/DL — SIGNIFICANT CHANGE UP (ref 0.5–1.3)
EGFR: 97 ML/MIN/1.73M2 — SIGNIFICANT CHANGE UP
EGFR: 97 ML/MIN/1.73M2 — SIGNIFICANT CHANGE UP
EOSINOPHIL # BLD AUTO: 0.05 K/UL — SIGNIFICANT CHANGE UP (ref 0–0.5)
EOSINOPHIL NFR BLD AUTO: 0.4 % — SIGNIFICANT CHANGE UP (ref 0–6)
GLUCOSE SERPL-MCNC: 257 MG/DL — HIGH (ref 70–99)
HCT VFR BLD CALC: 37.5 % — LOW (ref 39–50)
HGB BLD-MCNC: 12.3 G/DL — LOW (ref 13–17)
IMM GRANULOCYTES # BLD AUTO: 0.07 K/UL — SIGNIFICANT CHANGE UP (ref 0–0.07)
IMM GRANULOCYTES NFR BLD AUTO: 0.6 % — SIGNIFICANT CHANGE UP (ref 0–0.9)
LYMPHOCYTES # BLD AUTO: 1.16 K/UL — SIGNIFICANT CHANGE UP (ref 1–3.3)
LYMPHOCYTES NFR BLD AUTO: 9.9 % — LOW (ref 13–44)
MAGNESIUM SERPL-MCNC: 1.8 MG/DL — SIGNIFICANT CHANGE UP (ref 1.6–2.6)
MCHC RBC-ENTMCNC: 28.8 PG — SIGNIFICANT CHANGE UP (ref 27–34)
MCHC RBC-ENTMCNC: 32.8 G/DL — SIGNIFICANT CHANGE UP (ref 32–36)
MCV RBC AUTO: 87.8 FL — SIGNIFICANT CHANGE UP (ref 80–100)
MONOCYTES # BLD AUTO: 0.89 K/UL — SIGNIFICANT CHANGE UP (ref 0–0.9)
MONOCYTES NFR BLD AUTO: 7.6 % — SIGNIFICANT CHANGE UP (ref 2–14)
NEUTROPHILS # BLD AUTO: 9.46 K/UL — HIGH (ref 1.8–7.4)
NEUTROPHILS NFR BLD AUTO: 81.2 % — HIGH (ref 43–77)
NRBC # BLD AUTO: 0 K/UL — SIGNIFICANT CHANGE UP (ref 0–0)
NRBC # FLD: 0 K/UL — SIGNIFICANT CHANGE UP (ref 0–0)
NRBC BLD AUTO-RTO: 0 /100 WBCS — SIGNIFICANT CHANGE UP (ref 0–0)
PHOSPHATE SERPL-MCNC: 2.3 MG/DL — LOW (ref 2.5–4.5)
PLATELET # BLD AUTO: 175 K/UL — SIGNIFICANT CHANGE UP (ref 150–400)
PMV BLD: 9 FL — SIGNIFICANT CHANGE UP (ref 7–13)
POTASSIUM SERPL-MCNC: 4.4 MMOL/L — SIGNIFICANT CHANGE UP (ref 3.5–5.3)
POTASSIUM SERPL-SCNC: 4.4 MMOL/L — SIGNIFICANT CHANGE UP (ref 3.5–5.3)
RBC # BLD: 4.27 M/UL — SIGNIFICANT CHANGE UP (ref 4.2–5.8)
RBC # FLD: 13.3 % — SIGNIFICANT CHANGE UP (ref 10.3–14.5)
SODIUM SERPL-SCNC: 140 MMOL/L — SIGNIFICANT CHANGE UP (ref 135–145)
WBC # BLD: 11.67 K/UL — HIGH (ref 3.8–10.5)
WBC # FLD AUTO: 11.67 K/UL — HIGH (ref 3.8–10.5)

## 2025-04-23 RX ORDER — OXYCODONE HYDROCHLORIDE AND ACETAMINOPHEN 10; 325 MG/1; MG/1
1 TABLET ORAL
Qty: 20 | Refills: 0
Start: 2025-04-23

## 2025-04-23 RX ORDER — SOD PHOS DI, MONO/K PHOS MONO 250 MG
1 TABLET ORAL ONCE
Refills: 0 | Status: COMPLETED | OUTPATIENT
Start: 2025-04-23 | End: 2025-04-23

## 2025-04-23 RX ADMIN — ENOXAPARIN SODIUM 40 MILLIGRAM(S): 100 INJECTION SUBCUTANEOUS at 05:45

## 2025-04-23 RX ADMIN — POTASSIUM CHLORIDE, DEXTROSE MONOHYDRATE AND SODIUM CHLORIDE 75 MILLILITER(S): 150; 5; 900 INJECTION, SOLUTION INTRAVENOUS at 05:46

## 2025-04-23 RX ADMIN — Medication 1 PACKET(S): at 10:26

## 2025-04-23 RX ADMIN — Medication 3 MILLILITER(S): at 02:40

## 2025-04-23 RX ADMIN — AMLODIPINE BESYLATE 10 MILLIGRAM(S): 10 TABLET ORAL at 10:25

## 2025-04-23 RX ADMIN — METOPROLOL SUCCINATE 100 MILLIGRAM(S): 50 TABLET, EXTENDED RELEASE ORAL at 10:25

## 2025-04-23 RX ADMIN — LOSARTAN POTASSIUM 50 MILLIGRAM(S): 100 TABLET, FILM COATED ORAL at 10:26

## 2025-04-23 NOTE — DISCHARGE NOTE PROVIDER - NSDCMRMEDTOKEN_GEN_ALL_CORE_FT
amLODIPine 10 mg oral tablet: 1 tab(s) orally once a day  irbesartan 150 mg oral tablet: 1 tab(s) orally once a day  Toprol- mg oral tablet, extended release: 1 tab(s) orally once a day  zolpidem 12.5 mg oral tablet, extended release: 1 tab(s) orally once a day (at bedtime)   amLODIPine 10 mg oral tablet: 1 tab(s) orally once a day  irbesartan 150 mg oral tablet: 1 tab(s) orally once a day  oxycodone-acetaminophen 5 mg-325 mg oral tablet: 1 tab(s) orally every 6 hours as needed for  moderate pain MDD: 004  Toprol- mg oral tablet, extended release: 1 tab(s) orally once a day  zolpidem 12.5 mg oral tablet, extended release: 1 tab(s) orally once a day (at bedtime)

## 2025-04-23 NOTE — DISCHARGE NOTE PROVIDER - HOSPITAL COURSE
65 yo male with a mass in his sigmoid colon, bx + for adenocarcinoma s/p L colectomy with omentectomy POD2. Pt is doing well, sarah diet, no n/v, having BMs and passing flatus.

## 2025-04-23 NOTE — DISCHARGE NOTE PROVIDER - NSDCFUADDINST_GEN_ALL_CORE_FT
Please read the instructions outlined below and refer to them for the next few weeks. These discharge instructions provide you with general information on caring for yourself after surgery. While your treatment has been planned according to the most current medical practices available, unavoidable complications occasionally occur. If you have any problems or questions after discharge, please call your surgeon.      DIET: Soft foods. No salads or raw/steamed fruit/vegetables for the first two weeks.  Eat six smaller  meals, rather than three main meals for the first week or two. It commonly takes 2-3 weeks for  the bowel pattern to normalize. During that time, take nothing stronger than prune juice (4-6 oz.  warmed) to encourage a bowel movement.    ACTIVITY: It is also normal to feel tired and take naps in the afternoon. Avoid lifting over 10 pounds. You may shower, but no tub bathing. Stairs are okay. You may ride in a car. Walking is encouraged. You may drive if not on oral narcotic  pain meds. Be advised narcotics such as Percocet can cauze drowsiness.     MEDICATIONS: (Please refer to the hospital discharge medication form)    DRESSING: May remove abdominal dressing if present 1 week from date of surgery, and leave open to the air.     Resume all your usual pre-surgery medicines.  It is normal to be sore for 2-4 weeks following surgery. Call your surgeon if this seems to be getting worse rather than better. Only take over-the-counter or prescription medicines for pain, discomfort, or fever as directed by your surgeon.    CALL YOUR SURGEONS OFFICE IF YOU DEVELOP:    An wound which becomes red, swollen, increasingly painful or begins to bleed/drain.  An unexplained temperature over 101° F (38.3° C).  Experience worsening abdominal pain, nausea, or vomiting.  Bleeding with bowel movements    FOLLOW UP:    May take tylenol for mild pain  May take at home tramadol as well as needed for moderate pain   Small frequent meals  May shower    Notes the date for your after surgery appointment. Your postoperative course and pathology report will be reviewed. All questions will be answered, and continued care instructions given.

## 2025-04-23 NOTE — DISCHARGE NOTE PROVIDER - CARE PROVIDER_API CALL
Herman Beatty  Colon/Rectal Surgery  [address], [place] [address]  Phone: [phone]  Fax: [phone]  Follow Up Time: 2 weeks

## 2025-04-23 NOTE — PROGRESS NOTE ADULT - SUBJECTIVE AND OBJECTIVE BOX
SURGERY DAILY PROGRESS NOTE:     Subjective:  Patient seen and examined at bedside during am rounds doing well, sarah diet, having bowel fxn. Denies any fevers, chills, n/v/d, chest pain or shortness of breath    Objective:    MEDICATIONS  (STANDING):  amLODIPine   Tablet 10 milliGRAM(s) Oral daily  BUpivacaine liposome 1.3% Injectable (no eMAR) 20 milliLiter(s) Local Injection once  dextrose 5% + sodium chloride 0.9% with potassium chloride 20 mEq/L 1000 milliLiter(s) (75 mL/Hr) IV Continuous <Continuous>  enoxaparin Injectable 40 milliGRAM(s) SubCutaneous every 24 hours  losartan 50 milliGRAM(s) Oral daily  metoprolol succinate  milliGRAM(s) Oral daily  sodium chloride 0.9% lock flush 3 milliLiter(s) IV Push every 8 hours    MEDICATIONS  (PRN):  acetaminophen     Tablet .. 650 milliGRAM(s) Oral every 6 hours PRN Temp greater or equal to 38C (100.4F), Mild Pain (1 - 3)  fentaNYL    Injectable 50 MICROGram(s) IV Push every 10 minutes PRN Severe Pain (7 - 10)  ondansetron Injectable 4 milliGRAM(s) IV Push every 6 hours PRN Nausea and/or Vomiting  ondansetron Injectable 4 milliGRAM(s) IV Push once PRN Nausea and/or Vomiting  oxyCODONE    IR 5 milliGRAM(s) Oral once PRN Moderate Pain (4 - 6)  oxyCODONE    IR 5 milliGRAM(s) Oral every 4 hours PRN Moderate Pain (4 - 6)  oxyCODONE    IR 10 milliGRAM(s) Oral every 4 hours PRN Severe Pain (7 - 10)  zolpidem 5 milliGRAM(s) Oral at bedtime PRN Insomnia      Vital Signs Last 24 Hrs  T(C): 36.5 (23 Apr 2025 00:52), Max: 36.9 (22 Apr 2025 20:22)  T(F): 97.7 (23 Apr 2025 00:52), Max: 98.4 (22 Apr 2025 20:22)  HR: 67 (23 Apr 2025 00:52) (67 - 91)  BP: 162/66 (23 Apr 2025 00:52) (145/66 - 163/73)  BP(mean): --  RR: 15 (23 Apr 2025 00:52) (15 - 18)  SpO2: 98% (23 Apr 2025 00:52) (97% - 98%)    Parameters below as of 23 Apr 2025 00:52  Patient On (Oxygen Delivery Method): room air          PHYSICAL EXAM   Gen: well-appearing, in no acute distress  CV: pulse regularly present   Resp: airway patent, non-labored breathing  Abd: soft, NTND; no rebound or guarding. Incision c/d/i, NP seal in place  Ext. no cyanosis or edema      I&O's Detail    21 Apr 2025 07:01  -  22 Apr 2025 07:00  --------------------------------------------------------  IN:    Other (mL): 3000 mL  Total IN: 3000 mL    OUT:    Indwelling Catheter - Urethral (mL): 2460 mL  Total OUT: 2460 mL    Total NET: 540 mL      22 Apr 2025 07:01  -  23 Apr 2025 01:13  --------------------------------------------------------  IN:  Total IN: 0 mL    OUT:    Voided (mL): 1325 mL  Total OUT: 1325 mL    Total NET: -1325 mL          Daily     Daily     LABS:                        13.2   14.58 )-----------( 204      ( 22 Apr 2025 07:30 )             38.9     04-22    142  |  114[H]  |  12  ----------------------------<  133[H]  3.9   |  24  |  1.16    Ca    8.9      22 Apr 2025 07:30  Phos  2.4     04-22  Mg     2.0     04-22        Urinalysis Basic - ( 22 Apr 2025 07:30 )    Color: x / Appearance: x / SG: x / pH: x  Gluc: 133 mg/dL / Ketone: x  / Bili: x / Urobili: x   Blood: x / Protein: x / Nitrite: x   Leuk Esterase: x / RBC: x / WBC x   Sq Epi: x / Non Sq Epi: x / Bacteria: x            ASSESSMENT/PLAN:

## 2025-04-23 NOTE — PROGRESS NOTE ADULT - ASSESSMENT
63 yo M w left colon Ca  POD1 from lap left colectomy    - c/w diet  - DVT ppx  - OOB  - IS use  - AM labs  - DC folet  - DC ivf
63 yo M w left colon Ca  POD2 from lap left colectomy  doing well  sarah diet  +bowel fxn    P:  - c/w diet  - DVT ppx  - OOB  - IS use  - f/u AM labs  -pain control  -DC home today if labs nl

## 2025-04-23 NOTE — DISCHARGE NOTE NURSING/CASE MANAGEMENT/SOCIAL WORK - FINANCIAL ASSISTANCE
[facility] provides services at a reduced cost to those who are determined to be eligible through [facility]’s financial assistance program. Information regarding [facility]’s financial assistance program can be found by going to [email or web address] or by calling [phone].

## 2025-04-23 NOTE — DISCHARGE NOTE PROVIDER - NSDCACTIVITY_GEN_ALL_CORE
Return to Work/School allowed/Sex allowed/Showering allowed/Stairs allowed/Walking - Indoors allowed/No heavy lifting/straining/Walking - Outdoors allowed Return to Work/School allowed/Sex allowed/Showering allowed/Stairs allowed/Walking - Indoors allowed/No heavy lifting/straining/Walking - Outdoors allowed/Follow Instructions Provided by your Surgical Team/Activity as tolerated

## 2025-04-23 NOTE — DISCHARGE NOTE NURSING/CASE MANAGEMENT/SOCIAL WORK - PATIENT PORTAL LINK FT
You can access the FollowMyHealth Patient Portal offered by [facility] by registering at the following website: [email or web address]. By joining [facility]’s FollowMyHealth portal, you will also be able to view your health information using other applications (apps) compatible with our system.

## 2025-04-23 NOTE — DISCHARGE NOTE PROVIDER - NSDCCPTREATMENT_GEN_ALL_CORE_FT
PRINCIPAL PROCEDURE  Procedure: Hand assisted laparoscopic left colectomy  Findings and Treatment:

## 2025-04-23 NOTE — DISCHARGE NOTE NURSING/CASE MANAGEMENT/SOCIAL WORK - NSDCPEFALRISK_GEN_ALL_CORE
For information on Fall & Injury Prevention, visit: [email or web address] OR  [email or web address] OR  https://www.cdc.gov/steadi/patient.html

## 2025-04-23 NOTE — PROGRESS NOTE ADULT - ATTENDING COMMENTS
Patient passing flatus/BM, tolerating diet, no N/V, pain well-controlled and quite minimal.  Ambulating, voiding.  Abdomen is soft, nondistended, appropriately tender, NP seal C/D/I.  Labs with downtrending likely reactive leukocytosis, hypophosphatemia.  Plan to discharge to home today; replace phosphorus, continue low fiber diet.  Will need outpatient follow-up.  Instructed regarding diet upon discharge as well as removal of dressing on POD#5.

## 2025-04-24 ENCOUNTER — NON-APPOINTMENT (OUTPATIENT)
Age: 65
End: 2025-04-24

## 2025-04-24 LAB — SURGICAL PATHOLOGY STUDY: SIGNIFICANT CHANGE UP

## 2025-05-07 ENCOUNTER — APPOINTMENT (OUTPATIENT)
Dept: COLORECTAL SURGERY | Facility: CLINIC | Age: 65
End: 2025-05-07
Payer: COMMERCIAL

## 2025-05-07 VITALS
DIASTOLIC BLOOD PRESSURE: 80 MMHG | HEART RATE: 67 BPM | RESPIRATION RATE: 16 BRPM | BODY MASS INDEX: 29.82 KG/M2 | WEIGHT: 213 LBS | HEIGHT: 71 IN | SYSTOLIC BLOOD PRESSURE: 128 MMHG | TEMPERATURE: 97.3 F

## 2025-05-07 DIAGNOSIS — Z09 ENCOUNTER FOR FOLLOW-UP EXAMINATION AFTER COMPLETED TREATMENT FOR CONDITIONS OTHER THAN MALIGNANT NEOPLASM: ICD-10-CM

## 2025-05-07 PROCEDURE — 99024 POSTOP FOLLOW-UP VISIT: CPT

## 2025-07-15 ENCOUNTER — APPOINTMENT (OUTPATIENT)
Dept: COLORECTAL SURGERY | Facility: CLINIC | Age: 65
End: 2025-07-15
Payer: COMMERCIAL

## 2025-07-15 PROCEDURE — 99024 POSTOP FOLLOW-UP VISIT: CPT
